# Patient Record
Sex: FEMALE | Race: WHITE | NOT HISPANIC OR LATINO | URBAN - METROPOLITAN AREA
[De-identification: names, ages, dates, MRNs, and addresses within clinical notes are randomized per-mention and may not be internally consistent; named-entity substitution may affect disease eponyms.]

---

## 2022-03-23 ENCOUNTER — LAB (OUTPATIENT)
Dept: LAB | Facility: HOSPITAL | Age: 72
End: 2022-03-23

## 2022-03-23 ENCOUNTER — OFFICE VISIT (OUTPATIENT)
Dept: INTERNAL MEDICINE | Facility: CLINIC | Age: 72
End: 2022-03-23

## 2022-03-23 VITALS
DIASTOLIC BLOOD PRESSURE: 80 MMHG | TEMPERATURE: 97.5 F | HEIGHT: 67 IN | WEIGHT: 213 LBS | BODY MASS INDEX: 33.43 KG/M2 | SYSTOLIC BLOOD PRESSURE: 124 MMHG

## 2022-03-23 DIAGNOSIS — E78.49 OTHER HYPERLIPIDEMIA: ICD-10-CM

## 2022-03-23 DIAGNOSIS — I10 BENIGN ESSENTIAL HTN: Primary | ICD-10-CM

## 2022-03-23 DIAGNOSIS — Z11.59 ENCOUNTER FOR HEPATITIS C SCREENING TEST FOR LOW RISK PATIENT: ICD-10-CM

## 2022-03-23 LAB
ALBUMIN SERPL-MCNC: 4.6 G/DL (ref 3.5–5.2)
ALBUMIN/GLOB SERPL: 1.8 G/DL
ALP SERPL-CCNC: 92 U/L (ref 39–117)
ALT SERPL W P-5'-P-CCNC: 18 U/L (ref 1–33)
ANION GAP SERPL CALCULATED.3IONS-SCNC: 11.1 MMOL/L (ref 5–15)
AST SERPL-CCNC: 25 U/L (ref 1–32)
BILIRUB SERPL-MCNC: 0.4 MG/DL (ref 0–1.2)
BUN SERPL-MCNC: 20 MG/DL (ref 8–23)
BUN/CREAT SERPL: 25.3 (ref 7–25)
CALCIUM SPEC-SCNC: 9.4 MG/DL (ref 8.6–10.5)
CHLORIDE SERPL-SCNC: 105 MMOL/L (ref 98–107)
CHOLEST SERPL-MCNC: 141 MG/DL (ref 0–200)
CO2 SERPL-SCNC: 24.9 MMOL/L (ref 22–29)
CREAT SERPL-MCNC: 0.79 MG/DL (ref 0.57–1)
DEPRECATED RDW RBC AUTO: 39.6 FL (ref 37–54)
EGFRCR SERPLBLD CKD-EPI 2021: 81.1 ML/MIN/1.73
ERYTHROCYTE [DISTWIDTH] IN BLOOD BY AUTOMATED COUNT: 12.2 % (ref 12.3–15.4)
GLOBULIN UR ELPH-MCNC: 2.5 GM/DL
GLUCOSE SERPL-MCNC: 94 MG/DL (ref 65–99)
HCT VFR BLD AUTO: 41.1 % (ref 34–46.6)
HCV AB SER DONR QL: NORMAL
HDLC SERPL-MCNC: 54 MG/DL (ref 40–60)
HGB BLD-MCNC: 13.7 G/DL (ref 12–15.9)
LDLC SERPL CALC-MCNC: 70 MG/DL (ref 0–100)
LDLC/HDLC SERPL: 1.28 {RATIO}
MCH RBC QN AUTO: 29.5 PG (ref 26.6–33)
MCHC RBC AUTO-ENTMCNC: 33.3 G/DL (ref 31.5–35.7)
MCV RBC AUTO: 88.4 FL (ref 79–97)
PLATELET # BLD AUTO: 210 10*3/MM3 (ref 140–450)
PMV BLD AUTO: 11.5 FL (ref 6–12)
POTASSIUM SERPL-SCNC: 4.3 MMOL/L (ref 3.5–5.2)
PROT SERPL-MCNC: 7.1 G/DL (ref 6–8.5)
RBC # BLD AUTO: 4.65 10*6/MM3 (ref 3.77–5.28)
SODIUM SERPL-SCNC: 141 MMOL/L (ref 136–145)
TRIGL SERPL-MCNC: 89 MG/DL (ref 0–150)
VLDLC SERPL-MCNC: 17 MG/DL (ref 5–40)
WBC NRBC COR # BLD: 5.47 10*3/MM3 (ref 3.4–10.8)

## 2022-03-23 PROCEDURE — 86803 HEPATITIS C AB TEST: CPT | Performed by: INTERNAL MEDICINE

## 2022-03-23 PROCEDURE — 80061 LIPID PANEL: CPT | Performed by: INTERNAL MEDICINE

## 2022-03-23 PROCEDURE — 85027 COMPLETE CBC AUTOMATED: CPT | Performed by: INTERNAL MEDICINE

## 2022-03-23 PROCEDURE — 80053 COMPREHEN METABOLIC PANEL: CPT | Performed by: INTERNAL MEDICINE

## 2022-03-23 PROCEDURE — 99204 OFFICE O/P NEW MOD 45 MIN: CPT | Performed by: INTERNAL MEDICINE

## 2022-03-23 RX ORDER — ATORVASTATIN CALCIUM 10 MG/1
10 TABLET, FILM COATED ORAL DAILY
COMMUNITY
Start: 2022-02-25 | End: 2022-11-02 | Stop reason: SDUPTHER

## 2022-03-23 RX ORDER — LISINOPRIL AND HYDROCHLOROTHIAZIDE 12.5; 1 MG/1; MG/1
1 TABLET ORAL DAILY
COMMUNITY
End: 2022-11-02

## 2022-03-23 NOTE — PROGRESS NOTES
"Subjective   Love Mackenzie is a 71 y.o. female here to establish care for HTN and HLD.  Patient not sure how long she has been on these medications.  She is actually been out of her blood pressure medication for at least several days (if not weeks) and blood pressure has been well controlled.  She is due for blood work, would like to get cholesterol checked today.  He is up-to-date on her mammogram and colonoscopy.  She is a former patient of Dr. Giselle Chapman.    Review of Systems   Constitutional: Negative.    HENT: Negative.    Eyes: Negative.    Respiratory: Negative.    Cardiovascular: Negative.    Gastrointestinal: Negative.    Endocrine: Negative.    Genitourinary: Negative.    Musculoskeletal: Negative.    Skin: Negative.    Allergic/Immunologic: Negative.    Neurological: Negative.    Hematological: Negative.    Psychiatric/Behavioral: Negative.        History reviewed. No pertinent past medical history.  Family History   Problem Relation Age of Onset   • Stroke Mother    • Cancer Father    • Liver disease Brother      Past Surgical History:   Procedure Laterality Date   • BREAST LUMPECTOMY       Social History     Socioeconomic History   • Marital status:    Tobacco Use   • Smoking status: Never Smoker   • Smokeless tobacco: Never Used   Vaping Use   • Vaping Use: Never used   Substance and Sexual Activity   • Alcohol use: Yes     Comment: 2-3   • Drug use: Never         Current Outpatient Medications:   •  atorvastatin (LIPITOR) 10 MG tablet, Take 10 mg by mouth Daily., Disp: , Rfl:   •  lisinopril-hydrochlorothiazide (PRINZIDE,ZESTORETIC) 10-12.5 MG per tablet, Take 1 tablet by mouth Daily., Disp: , Rfl:     Objective   /80   Temp 97.5 °F (36.4 °C) (Temporal)   Ht 170.2 cm (67\")   Wt 96.6 kg (213 lb)   BMI 33.36 kg/m²   Physical Exam  Vitals reviewed.   Constitutional:       Appearance: She is well-developed.   HENT:      Head: Normocephalic and atraumatic.      Nose: Nose " normal.   Eyes:      Conjunctiva/sclera: Conjunctivae normal.   Cardiovascular:      Rate and Rhythm: Normal rate and regular rhythm.      Heart sounds: Normal heart sounds. No murmur heard.    No friction rub. No gallop.   Pulmonary:      Effort: Pulmonary effort is normal.      Breath sounds: Normal breath sounds. No wheezing.   Musculoskeletal:      Comments: Normal gait and station   Skin:     General: Skin is warm and dry.   Neurological:      Mental Status: She is alert and oriented to person, place, and time.   Psychiatric:         Behavior: Behavior normal.         Thought Content: Thought content normal.         Judgment: Judgment normal.         Assessment/Plan   Diagnoses and all orders for this visit:    1. Benign essential HTN (Primary)  -     CBC (No Diff)  -     Comprehensive Metabolic Panel  -Stop BP med as she has been off of it and blood pressure has been well controlled.  Will have patient keep close tabs on blood pressure readings and will see her again soon for physical to monitor need for resumption of blood pressure medication    2. Other hyperlipidemia  -     Lipid Panel  -Continue statin but will do ASCVD score and discuss statin with patient    3. Encounter for hepatitis C screening test for low risk patient  -     Hepatitis C Antibody

## 2022-09-26 ENCOUNTER — TELEPHONE (OUTPATIENT)
Dept: INTERNAL MEDICINE | Facility: CLINIC | Age: 72
End: 2022-09-26

## 2022-10-18 ENCOUNTER — TELEPHONE (OUTPATIENT)
Dept: INTERNAL MEDICINE | Facility: CLINIC | Age: 72
End: 2022-10-18

## 2022-10-18 NOTE — TELEPHONE ENCOUNTER
Caller: Love Mackenzie    Relationship to patient: Self    Best call back number:[phone]      PATIENT HAS A PHYSICAL SCHEDULED FOR  November 2, 2022 WITH A PROVIDER THAT IS NOT HER PCP

## 2022-11-02 ENCOUNTER — OFFICE VISIT (OUTPATIENT)
Dept: INTERNAL MEDICINE | Facility: CLINIC | Age: 72
End: 2022-11-02

## 2022-11-02 ENCOUNTER — LAB (OUTPATIENT)
Dept: LAB | Facility: HOSPITAL | Age: 72
End: 2022-11-02

## 2022-11-02 ENCOUNTER — TELEPHONE (OUTPATIENT)
Dept: INTERNAL MEDICINE | Facility: CLINIC | Age: 72
End: 2022-11-02

## 2022-11-02 VITALS
WEIGHT: 214 LBS | HEART RATE: 75 BPM | SYSTOLIC BLOOD PRESSURE: 122 MMHG | BODY MASS INDEX: 33.59 KG/M2 | RESPIRATION RATE: 16 BRPM | HEIGHT: 67 IN | TEMPERATURE: 98.5 F | DIASTOLIC BLOOD PRESSURE: 74 MMHG | OXYGEN SATURATION: 98 %

## 2022-11-02 DIAGNOSIS — Z13.220 LIPID SCREENING: ICD-10-CM

## 2022-11-02 DIAGNOSIS — E78.49 OTHER HYPERLIPIDEMIA: ICD-10-CM

## 2022-11-02 DIAGNOSIS — Z13.1 SCREENING FOR DIABETES MELLITUS: ICD-10-CM

## 2022-11-02 DIAGNOSIS — I10 BENIGN ESSENTIAL HTN: ICD-10-CM

## 2022-11-02 DIAGNOSIS — Z13.0 SCREENING FOR BLOOD DISEASE: ICD-10-CM

## 2022-11-02 DIAGNOSIS — Z13.21 ENCOUNTER FOR VITAMIN DEFICIENCY SCREENING: ICD-10-CM

## 2022-11-02 DIAGNOSIS — Z13.29 THYROID DISORDER SCREENING: ICD-10-CM

## 2022-11-02 DIAGNOSIS — Z00.00 WELLNESS EXAMINATION: Primary | ICD-10-CM

## 2022-11-02 PROCEDURE — 82746 ASSAY OF FOLIC ACID SERUM: CPT | Performed by: NURSE PRACTITIONER

## 2022-11-02 PROCEDURE — 80050 GENERAL HEALTH PANEL: CPT | Performed by: NURSE PRACTITIONER

## 2022-11-02 PROCEDURE — 83036 HEMOGLOBIN GLYCOSYLATED A1C: CPT | Performed by: NURSE PRACTITIONER

## 2022-11-02 PROCEDURE — 82607 VITAMIN B-12: CPT | Performed by: NURSE PRACTITIONER

## 2022-11-02 PROCEDURE — 99397 PER PM REEVAL EST PAT 65+ YR: CPT | Performed by: NURSE PRACTITIONER

## 2022-11-02 PROCEDURE — 82306 VITAMIN D 25 HYDROXY: CPT | Performed by: NURSE PRACTITIONER

## 2022-11-02 PROCEDURE — 80061 LIPID PANEL: CPT | Performed by: NURSE PRACTITIONER

## 2022-11-02 RX ORDER — ATORVASTATIN CALCIUM 10 MG/1
10 TABLET, FILM COATED ORAL DAILY
Qty: 90 TABLET | Refills: 1 | Status: SHIPPED | OUTPATIENT
Start: 2022-11-02 | End: 2022-11-11 | Stop reason: SDUPTHER

## 2022-11-02 NOTE — TELEPHONE ENCOUNTER
Patient had colonoscopy at [facility] about 9 years ago. Not due until next year. Can you call over and ask for records for her health maintanence Thanks.

## 2022-11-03 LAB
25(OH)D3 SERPL-MCNC: 70.1 NG/ML (ref 30–100)
ALBUMIN SERPL-MCNC: 4.7 G/DL (ref 3.5–5.2)
ALBUMIN/GLOB SERPL: 2 G/DL
ALP SERPL-CCNC: 102 U/L (ref 39–117)
ALT SERPL W P-5'-P-CCNC: 23 U/L (ref 1–33)
ANION GAP SERPL CALCULATED.3IONS-SCNC: 9.1 MMOL/L (ref 5–15)
AST SERPL-CCNC: 29 U/L (ref 1–32)
BILIRUB SERPL-MCNC: 0.3 MG/DL (ref 0–1.2)
BUN SERPL-MCNC: 21 MG/DL (ref 8–23)
BUN/CREAT SERPL: 25.9 (ref 7–25)
CALCIUM SPEC-SCNC: 9.6 MG/DL (ref 8.6–10.5)
CHLORIDE SERPL-SCNC: 106 MMOL/L (ref 98–107)
CHOLEST SERPL-MCNC: 185 MG/DL (ref 0–200)
CO2 SERPL-SCNC: 26.9 MMOL/L (ref 22–29)
CREAT SERPL-MCNC: 0.81 MG/DL (ref 0.57–1)
DEPRECATED RDW RBC AUTO: 43.1 FL (ref 37–54)
EGFRCR SERPLBLD CKD-EPI 2021: 78.2 ML/MIN/1.73
ERYTHROCYTE [DISTWIDTH] IN BLOOD BY AUTOMATED COUNT: 12.6 % (ref 12.3–15.4)
FOLATE SERPL-MCNC: >20 NG/ML (ref 4.78–24.2)
GLOBULIN UR ELPH-MCNC: 2.4 GM/DL
GLUCOSE SERPL-MCNC: 84 MG/DL (ref 65–99)
HBA1C MFR BLD: 5.7 % (ref 4.8–5.6)
HCT VFR BLD AUTO: 43.3 % (ref 34–46.6)
HDLC SERPL-MCNC: 62 MG/DL (ref 40–60)
HGB BLD-MCNC: 14.2 G/DL (ref 12–15.9)
LDLC SERPL CALC-MCNC: 110 MG/DL (ref 0–100)
LDLC/HDLC SERPL: 1.75 {RATIO}
MCH RBC QN AUTO: 30.4 PG (ref 26.6–33)
MCHC RBC AUTO-ENTMCNC: 32.8 G/DL (ref 31.5–35.7)
MCV RBC AUTO: 92.7 FL (ref 79–97)
PLATELET # BLD AUTO: 204 10*3/MM3 (ref 140–450)
PMV BLD AUTO: 11.4 FL (ref 6–12)
POTASSIUM SERPL-SCNC: 4.8 MMOL/L (ref 3.5–5.2)
PROT SERPL-MCNC: 7.1 G/DL (ref 6–8.5)
RBC # BLD AUTO: 4.67 10*6/MM3 (ref 3.77–5.28)
SODIUM SERPL-SCNC: 142 MMOL/L (ref 136–145)
TRIGL SERPL-MCNC: 71 MG/DL (ref 0–150)
TSH SERPL DL<=0.05 MIU/L-ACNC: 2.79 UIU/ML (ref 0.27–4.2)
VIT B12 BLD-MCNC: 818 PG/ML (ref 211–946)
VLDLC SERPL-MCNC: 13 MG/DL (ref 5–40)
WBC NRBC COR # BLD: 5.8 10*3/MM3 (ref 3.4–10.8)

## 2022-11-11 RX ORDER — ATORVASTATIN CALCIUM 10 MG/1
10 TABLET, FILM COATED ORAL DAILY
Qty: 90 TABLET | Refills: 1 | Status: SHIPPED | OUTPATIENT
Start: 2022-11-11

## 2022-11-11 NOTE — TELEPHONE ENCOUNTER
Caller: Love Mackenzie    Relationship: Self    Best call back number:     [phone]    Requested Prescriptions:      atorvastatin (LIPITOR) 10 MG tablet    Pharmacy where request should be sent: [id number] [place] [address] [address] [facility] [address] - [phone]  - [phone]      Additional details provided by patient:     PATIENT STATED SHE IS OUT OF THE MEDICATION    Does the patient have less than a 3 day supply:  [x] Yes  [] No    Cecy Motley Rep   11/11/22 15:22 EST     SORAYA CUBAL

## 2023-11-06 RX ORDER — ATORVASTATIN CALCIUM 10 MG/1
10 TABLET, FILM COATED ORAL DAILY
Qty: 90 TABLET | Refills: 0 | OUTPATIENT
Start: 2023-11-06

## 2023-11-08 ENCOUNTER — LAB (OUTPATIENT)
Dept: INTERNAL MEDICINE | Facility: CLINIC | Age: 73
End: 2023-11-08
Payer: MEDICARE

## 2023-11-08 ENCOUNTER — OFFICE VISIT (OUTPATIENT)
Dept: INTERNAL MEDICINE | Facility: CLINIC | Age: 73
End: 2023-11-08
Payer: MEDICARE

## 2023-11-08 VITALS
HEART RATE: 72 BPM | DIASTOLIC BLOOD PRESSURE: 84 MMHG | HEIGHT: 67 IN | OXYGEN SATURATION: 95 % | WEIGHT: 218 LBS | SYSTOLIC BLOOD PRESSURE: 132 MMHG | BODY MASS INDEX: 34.21 KG/M2

## 2023-11-08 DIAGNOSIS — Z00.00 WELCOME TO MEDICARE PREVENTIVE VISIT: Primary | ICD-10-CM

## 2023-11-08 DIAGNOSIS — Z12.11 COLON CANCER SCREENING: ICD-10-CM

## 2023-11-08 DIAGNOSIS — Z78.0 POST-MENOPAUSAL: ICD-10-CM

## 2023-11-08 DIAGNOSIS — I10 BENIGN ESSENTIAL HTN: ICD-10-CM

## 2023-11-08 DIAGNOSIS — R73.03 PREDIABETES: ICD-10-CM

## 2023-11-08 DIAGNOSIS — I10 BENIGN ESSENTIAL HTN: Primary | ICD-10-CM

## 2023-11-08 LAB
ALBUMIN SERPL-MCNC: 4.4 G/DL (ref 3.5–5.2)
ALBUMIN/GLOB SERPL: 1.6 G/DL
ALP SERPL-CCNC: 98 U/L (ref 39–117)
ALT SERPL W P-5'-P-CCNC: 17 U/L (ref 1–33)
ANION GAP SERPL CALCULATED.3IONS-SCNC: 10 MMOL/L (ref 5–15)
AST SERPL-CCNC: 24 U/L (ref 1–32)
BILIRUB SERPL-MCNC: 0.4 MG/DL (ref 0–1.2)
BUN SERPL-MCNC: 19 MG/DL (ref 8–23)
BUN/CREAT SERPL: 24.7 (ref 7–25)
CALCIUM SPEC-SCNC: 9.5 MG/DL (ref 8.6–10.5)
CHLORIDE SERPL-SCNC: 105 MMOL/L (ref 98–107)
CHOLEST SERPL-MCNC: 144 MG/DL (ref 0–200)
CO2 SERPL-SCNC: 25 MMOL/L (ref 22–29)
CREAT SERPL-MCNC: 0.77 MG/DL (ref 0.57–1)
DEPRECATED RDW RBC AUTO: 37.7 FL (ref 37–54)
EGFRCR SERPLBLD CKD-EPI 2021: 82.6 ML/MIN/1.73
ERYTHROCYTE [DISTWIDTH] IN BLOOD BY AUTOMATED COUNT: 11.9 % (ref 12.3–15.4)
GLOBULIN UR ELPH-MCNC: 2.7 GM/DL
GLUCOSE SERPL-MCNC: 83 MG/DL (ref 65–99)
HBA1C MFR BLD: 5.6 % (ref 4.8–5.6)
HCT VFR BLD AUTO: 41.4 % (ref 34–46.6)
HDLC SERPL-MCNC: 57 MG/DL (ref 40–60)
HGB BLD-MCNC: 13.9 G/DL (ref 12–15.9)
LDLC SERPL CALC-MCNC: 71 MG/DL (ref 0–100)
LDLC/HDLC SERPL: 1.24 {RATIO}
MCH RBC QN AUTO: 29.6 PG (ref 26.6–33)
MCHC RBC AUTO-ENTMCNC: 33.6 G/DL (ref 31.5–35.7)
MCV RBC AUTO: 88.1 FL (ref 79–97)
PLATELET # BLD AUTO: 199 10*3/MM3 (ref 140–450)
PMV BLD AUTO: 11.6 FL (ref 6–12)
POTASSIUM SERPL-SCNC: 4.7 MMOL/L (ref 3.5–5.2)
PROT SERPL-MCNC: 7.1 G/DL (ref 6–8.5)
RBC # BLD AUTO: 4.7 10*6/MM3 (ref 3.77–5.28)
SODIUM SERPL-SCNC: 140 MMOL/L (ref 136–145)
TRIGL SERPL-MCNC: 83 MG/DL (ref 0–150)
VLDLC SERPL-MCNC: 16 MG/DL (ref 5–40)
WBC NRBC COR # BLD: 5.21 10*3/MM3 (ref 3.4–10.8)

## 2023-11-08 PROCEDURE — 83036 HEMOGLOBIN GLYCOSYLATED A1C: CPT | Performed by: INTERNAL MEDICINE

## 2023-11-08 PROCEDURE — 80061 LIPID PANEL: CPT | Performed by: INTERNAL MEDICINE

## 2023-11-08 PROCEDURE — 36415 COLL VENOUS BLD VENIPUNCTURE: CPT | Performed by: INTERNAL MEDICINE

## 2023-11-08 PROCEDURE — 80053 COMPREHEN METABOLIC PANEL: CPT | Performed by: INTERNAL MEDICINE

## 2023-11-08 PROCEDURE — 85027 COMPLETE CBC AUTOMATED: CPT | Performed by: INTERNAL MEDICINE

## 2023-11-08 NOTE — PROGRESS NOTES
The ABCs of the Annual Wellness Visit  [place] Visit    Subjective     Love Mackenzie is a 73 y.o. female who presents for a  Welcome to Medicare Visit.    The following portions of the patient's history were reviewed and   updated as appropriate: allergies, current medications, past medical history, past social history, past surgical history, and problem list.     Compared to one year ago, the patient feels her physical   health is the same.    Compared to one year ago, the patient feels her mental   health is the same.    Recent Hospitalizations:  She was not admitted to the hospital during the last year.       Current Medical Providers:  Patient Care Team:  Silvia Garcia MD as PCP - General (Internal Medicine)    Outpatient Medications Prior to Visit   Medication Sig Dispense Refill   • atorvastatin (LIPITOR) 10 MG tablet Take 1 tablet by mouth Daily. 90 tablet 1     No facility-administered medications prior to visit.       No opioid medication identified on active medication list. I have reviewed chart for other potential  high risk medication/s and harmful drug interactions in the elderly.        Physical Exam  Vitals reviewed.   Constitutional:       General: She is not in acute distress.     Appearance: Normal appearance. She is well-developed.   HENT:      Head: Normocephalic and atraumatic.      Right Ear: Tympanic membrane, ear canal and external ear normal.      Left Ear: Tympanic membrane, ear canal and external ear normal.      Nose: Nose normal.      Mouth/Throat:      Lips: Pink.      Mouth: Mucous membranes are moist.      Tongue: No lesions.      Pharynx: Oropharynx is clear.   Eyes:      General: Lids are normal. Vision grossly intact. No scleral icterus.     Conjunctiva/sclera: Conjunctivae normal.      Pupils: Pupils are equal, round, and reactive to light.   Neck:      Thyroid: No thyroid mass, thyromegaly or thyroid tenderness.      Vascular: No carotid bruit.    Cardiovascular:      Rate and Rhythm: Normal rate and regular rhythm.      Heart sounds: Normal heart sounds. No murmur heard.     No friction rub. No gallop. No S3 or S4 sounds.   Pulmonary:      Effort: Pulmonary effort is normal.      Breath sounds: Normal breath sounds. No wheezing, rhonchi or rales.   Abdominal:      General: Bowel sounds are normal. There is no distension.      Palpations: Abdomen is soft. There is no mass.      Tenderness: There is no abdominal tenderness.   Musculoskeletal:         General: Normal range of motion.      Cervical back: Neck supple.      Right lower leg: No edema.      Left lower leg: No edema.   Lymphadenopathy:      Cervical: No cervical adenopathy.   Skin:     General: Skin is warm and dry.      Coloration: Skin is not pale.      Findings: No erythema or rash.   Neurological:      Mental Status: She is alert and oriented to person, place, and time. Mental status is at baseline.      Cranial Nerves: No cranial nerve deficit.      Sensory: No sensory deficit.      Gait: Gait is intact.      Comments: CNs grossly intact   Psychiatric:         Attention and Perception: Attention normal.         Mood and Affect: Mood normal.         Speech: Speech normal.         Behavior: Behavior normal.         Thought Content: Thought content normal.         Judgment: Judgment normal.       Aspirin is not on active medication list.  Aspirin use is not indicated based on review of current medical condition/s. Risk of harm outweighs potential benefits.  .    Patient Active Problem List   Diagnosis   • Other hyperlipidemia   • Benign essential HTN     Advance Care Planning   Advance Care Planning     Advance Directive is not on file.  ACP discussion was declined by the patient. Patient does not have an advance directive, declines further assistance.       Objective   Vitals:    11/08/23 0903   BP: 132/84   BP Location: Left arm   Pulse: 72   SpO2: 95%   Weight: 98.9 kg (218 lb)   Height: 170.2  "cm (67\")     Estimated body mass index is 34.14 kg/m² as calculated from the following:    Height as of this encounter: 170.2 cm (67\").    Weight as of this encounter: 98.9 kg (218 lb).           Does the patient have evidence of cognitive impairment?   No         Procedures       HEALTH RISK ASSESSMENT    Smoking Status:  Social History     Tobacco Use   Smoking Status Never   • Passive exposure: Never   Smokeless Tobacco Never     Alcohol Consumption:  Social History     Substance and Sexual Activity   Alcohol Use Yes   • Alcohol/week: 2.0 standard drinks of alcohol   • Types: 2 Glasses of wine per week    Comment: 2-3       Fall Risk Screen:    LAUREN Fall Risk Assessment was completed, and patient is at LOW risk for falls.Assessment completed on:2023    Depression Screen:       2023     9:00 AM   PHQ-2/PHQ-9 Depression Screening   Little Interest or Pleasure in Doing Things 0-->not at all   Feeling Down, Depressed or Hopeless 0-->not at all   Trouble Falling or Staying Asleep, or Sleeping Too Much 0-->not at all   Feeling Tired or Having Little Energy 0-->not at all   Poor Appetite or Overeating 0-->not at all   Feeling Bad about Yourself - or that You are a Failure or Have Let Yourself or Your Family Down 0-->not at all   Trouble Concentrating on Things, Such as Reading the Newspaper or Watching Television 0-->not at all   Moving or Speaking So Slowly that Other People Could Have Noticed? Or the Opposite - Being So Fidgety 0-->not at all   Thoughts that You Would be Better Off Dead or of Hurting Yourself in Some Way 0-->not at all   PHQ-9: Brief Depression Severity Measure Score 0   If You Checked Off Any Problems, How Difficult Have These Problems Made It For You to Do Your Work, Take Care of Things at Home, or Get Along with Other People? not difficult at all       Health Habits and Functional and Cognitive Screenin/8/2023     9:00 AM   Functional & Cognitive Status   Do you have " difficulty preparing food and eating? No   Do you have difficulty bathing yourself, getting dressed or grooming yourself? No   Do you have difficulty using the toilet? No   Do you have difficulty moving around from place to place? No   Do you have trouble with steps or getting out of a bed or a chair? No   Current Diet Limited Junk Food   Dental Exam Up to date   Eye Exam Up to date   Exercise (times per week) 2 times per week   Current Exercises Include Walking   Do you need help using the phone?  No   Are you deaf or do you have serious difficulty hearing?  No   Do you need help to go to places out of walking distance? No   Do you need help shopping? No   Do you need help preparing meals?  No   Do you need help with housework?  No   Do you need help with laundry? No   Do you need help taking your medications? No   Do you need help managing money? No   Do you ever drive or ride in a car without wearing a seat belt? No   Have you felt unusual stress, anger or loneliness in the last month? No   Who do you live with? Spouse   If you need help, do you have trouble finding someone available to you? No   Have you been bothered in the last four weeks by sexual problems? No   Do you have difficulty concentrating, remembering or making decisions? No       Visual Acuity:    Vision Screening    Right eye Left eye Both eyes   Without correction 20/70  20/25   With correction  20/30 20/25       Age-appropriate Screening Schedule:  Refer to the list below for future screening recommendations based on patient's age, sex and/or medical conditions. Orders for these recommended tests are listed in the plan section. The patient has been provided with a written plan.    Health Maintenance   Topic Date Due   • DXA SCAN  Never done   • COLORECTAL CANCER SCREENING  Never done   • Pneumococcal Vaccine 65+ (2 - PPSV23 or PCV20) 03/29/2020   • ANNUAL WELLNESS VISIT  Never done   • LIPID PANEL  11/02/2023   • INFLUENZA VACCINE  03/31/2024  (Originally 8/1/2023)   • COVID-19 Vaccine (1) 11/10/2029 (Originally 3/5/1951)   • BMI FOLLOWUP  11/08/2024   • MAMMOGRAM  04/12/2025   • TDAP/TD VACCINES (2 - Td or Tdap) 03/09/2031   • HEPATITIS C SCREENING  Completed   • ZOSTER VACCINE  Completed        CMS Preventative Services Quick Reference  Risk Factors Identified During Encounter    Immunizations Discussed/Encouraged: Tdap, Influenza, Shingrix, and COVID19  The above risks/problems have been discussed with the patient.  Pertinent information has been shared with the patient in the After Visit Summary.  Follow up plans and orders are seen below in the Assessment/Plan Section.    Diagnoses and all orders for this visit:    1. Welcome to Medicare preventive visit (Primary)  -CSY and DXA ordered, UTD mammo  -has had prevnar 13  -defers flu and covid vax  -sees dentist and derm    2. Benign essential HTN  -     CBC (No Diff)  -     Comprehensive Metabolic Panel  -     Lipid Panel    3. Prediabetes  -     Hemoglobin A1c    4. Post-menopausal  -     DEXA Bone Density Axial; Future    5. Colon cancer screening  -     Ambulatory Referral For Screening Colonoscopy        Follow Up:   Initial Medicare Visit in one year    An After Visit Summary and PPPS were made available to the patient.

## 2023-11-15 RX ORDER — ATORVASTATIN CALCIUM 10 MG/1
10 TABLET, FILM COATED ORAL DAILY
Qty: 90 TABLET | Refills: 0 | Status: SHIPPED | OUTPATIENT
Start: 2023-11-15

## 2023-12-07 ENCOUNTER — HOSPITAL ENCOUNTER (OUTPATIENT)
Dept: BONE DENSITY | Facility: HOSPITAL | Age: 73
Discharge: HOME | End: 2023-12-07
Admitting: INTERNAL MEDICINE
Payer: MEDICARE

## 2023-12-07 DIAGNOSIS — Z78.0 POST-MENOPAUSAL: ICD-10-CM

## 2023-12-07 PROCEDURE — 77080 DXA BONE DENSITY AXIAL: CPT

## 2024-01-29 ENCOUNTER — OUTSIDE FACILITY SERVICE (OUTPATIENT)
Dept: GASTROENTEROLOGY | Facility: CLINIC | Age: 74
End: 2024-01-29
Payer: MEDICARE

## 2024-02-14 RX ORDER — ATORVASTATIN CALCIUM 10 MG/1
10 TABLET, FILM COATED ORAL DAILY
Qty: 90 TABLET | Refills: 0 | Status: SHIPPED | OUTPATIENT
Start: 2024-02-14

## 2024-05-17 ENCOUNTER — LAB (OUTPATIENT)
Facility: HOSPITAL | Age: 74
End: 2024-05-17
Payer: MEDICARE

## 2024-05-17 ENCOUNTER — APPOINTMENT (OUTPATIENT)
Facility: HOSPITAL | Age: 74
DRG: 871 | End: 2024-05-17
Payer: MEDICARE

## 2024-05-17 ENCOUNTER — HOSPITAL ENCOUNTER (OUTPATIENT)
Facility: HOSPITAL | Age: 74
Discharge: HOME | DRG: 871 | End: 2024-05-18
Attending: FAMILY MEDICINE | Admitting: FAMILY MEDICINE
Payer: MEDICARE

## 2024-05-17 ENCOUNTER — OFFICE VISIT (OUTPATIENT)
Dept: INTERNAL MEDICINE | Facility: CLINIC | Age: 74
End: 2024-05-17
Payer: MEDICARE

## 2024-05-17 VITALS
HEIGHT: 67 IN | BODY MASS INDEX: 34.53 KG/M2 | HEART RATE: 78 BPM | DIASTOLIC BLOOD PRESSURE: 82 MMHG | WEIGHT: 220 LBS | SYSTOLIC BLOOD PRESSURE: 130 MMHG | OXYGEN SATURATION: 99 % | TEMPERATURE: 98 F

## 2024-05-17 DIAGNOSIS — R19.7 ACUTE DIARRHEA: ICD-10-CM

## 2024-05-17 DIAGNOSIS — J96.01 ACUTE HYPOXIC RESPIRATORY FAILURE: ICD-10-CM

## 2024-05-17 DIAGNOSIS — R53.83 LETHARGY: Primary | ICD-10-CM

## 2024-05-17 DIAGNOSIS — R53.83 LETHARGY: ICD-10-CM

## 2024-05-17 DIAGNOSIS — J18.9 PNEUMONIA OF LEFT LOWER LOBE DUE TO INFECTIOUS ORGANISM: Primary | ICD-10-CM

## 2024-05-17 LAB
ALBUMIN SERPL-MCNC: 3.7 G/DL (ref 3.5–5.2)
ALBUMIN SERPL-MCNC: 3.8 G/DL (ref 3.5–5.2)
ALBUMIN/GLOB SERPL: 1.1 G/DL
ALBUMIN/GLOB SERPL: 1.2 G/DL
ALP SERPL-CCNC: 77 U/L (ref 39–117)
ALP SERPL-CCNC: 78 U/L (ref 39–117)
ALT SERPL W P-5'-P-CCNC: 25 U/L (ref 1–33)
ALT SERPL W P-5'-P-CCNC: 31 U/L (ref 1–33)
ANION GAP SERPL CALCULATED.3IONS-SCNC: 13.8 MMOL/L (ref 5–15)
ANION GAP SERPL CALCULATED.3IONS-SCNC: 17.2 MMOL/L (ref 5–15)
AST SERPL-CCNC: 24 U/L (ref 1–32)
AST SERPL-CCNC: 38 U/L (ref 1–32)
ATMOSPHERIC PRESS: ABNORMAL MM[HG]
BASE EXCESS BLDV CALC-SCNC: -1.9 MMOL/L (ref -2–2)
BASOPHILS # BLD AUTO: 0.02 10*3/MM3 (ref 0–0.2)
BASOPHILS # BLD AUTO: 0.03 10*3/MM3 (ref 0–0.2)
BASOPHILS NFR BLD AUTO: 0.2 % (ref 0–1.5)
BASOPHILS NFR BLD AUTO: 0.2 % (ref 0–1.5)
BDY SITE: ABNORMAL
BILIRUB SERPL-MCNC: 0.5 MG/DL (ref 0–1.2)
BILIRUB SERPL-MCNC: 0.6 MG/DL (ref 0–1.2)
BODY TEMPERATURE: 37
BUN SERPL-MCNC: 22 MG/DL (ref 8–23)
BUN SERPL-MCNC: 22 MG/DL (ref 8–23)
BUN/CREAT SERPL: 23.9 (ref 7–25)
BUN/CREAT SERPL: 25.9 (ref 7–25)
CALCIUM SPEC-SCNC: 8.7 MG/DL (ref 8.6–10.5)
CALCIUM SPEC-SCNC: 8.8 MG/DL (ref 8.6–10.5)
CHLORIDE SERPL-SCNC: 91 MMOL/L (ref 98–107)
CHLORIDE SERPL-SCNC: 95 MMOL/L (ref 98–107)
CO2 BLDA-SCNC: 22.1 MMOL/L (ref 22–33)
CO2 SERPL-SCNC: 17.8 MMOL/L (ref 22–29)
CO2 SERPL-SCNC: 20.2 MMOL/L (ref 22–29)
COHGB MFR BLD: 1.1 %
CREAT SERPL-MCNC: 0.85 MG/DL (ref 0.57–1)
CREAT SERPL-MCNC: 0.92 MG/DL (ref 0.57–1)
CRP SERPL-MCNC: 32.1 MG/DL (ref 0–0.5)
D-LACTATE SERPL-SCNC: 1.8 MMOL/L (ref 0.5–2)
DEPRECATED RDW RBC AUTO: 38.3 FL (ref 37–54)
DEPRECATED RDW RBC AUTO: 42.4 FL (ref 37–54)
EGFRCR SERPLBLD CKD-EPI 2021: 66.7 ML/MIN/1.73
EGFRCR SERPLBLD CKD-EPI 2021: 73.4 ML/MIN/1.73
EOSINOPHIL # BLD AUTO: 0 10*3/MM3 (ref 0–0.4)
EOSINOPHIL # BLD AUTO: 0 10*3/MM3 (ref 0–0.4)
EOSINOPHIL NFR BLD AUTO: 0 % (ref 0.3–6.2)
EOSINOPHIL NFR BLD AUTO: 0 % (ref 0.3–6.2)
EPAP: 0
ERYTHROCYTE [DISTWIDTH] IN BLOOD BY AUTOMATED COUNT: 12.1 % (ref 12.3–15.4)
ERYTHROCYTE [DISTWIDTH] IN BLOOD BY AUTOMATED COUNT: 12.8 % (ref 12.3–15.4)
ERYTHROCYTE [SEDIMENTATION RATE] IN BLOOD: 56 MM/HR (ref 0–30)
EXPIRATION DATE: NORMAL
FLUAV AG UPPER RESP QL IA.RAPID: NOT DETECTED
FLUAV RNA RESP QL NAA+PROBE: NOT DETECTED
FLUBV AG UPPER RESP QL IA.RAPID: NOT DETECTED
FLUBV RNA RESP QL NAA+PROBE: NOT DETECTED
GLOBULIN UR ELPH-MCNC: 3.2 GM/DL
GLOBULIN UR ELPH-MCNC: 3.5 GM/DL
GLUCOSE SERPL-MCNC: 120 MG/DL (ref 65–99)
GLUCOSE SERPL-MCNC: 123 MG/DL (ref 65–99)
HCO3 BLDV-SCNC: 21.1 MMOL/L (ref 22–28)
HCT VFR BLD AUTO: 37.8 % (ref 34–46.6)
HCT VFR BLD AUTO: 37.8 % (ref 34–46.6)
HETEROPH AB SER QL LA: NEGATIVE
HGB BLD-MCNC: 12.8 G/DL (ref 12–15.9)
HGB BLD-MCNC: 13.1 G/DL (ref 12–15.9)
HGB BLDA-MCNC: 13.1 G/DL (ref 14–18)
HOLD SPECIMEN: NORMAL
IMM GRANULOCYTES # BLD AUTO: 0.06 10*3/MM3 (ref 0–0.05)
IMM GRANULOCYTES # BLD AUTO: 0.07 10*3/MM3 (ref 0–0.05)
IMM GRANULOCYTES NFR BLD AUTO: 0.5 % (ref 0–0.5)
IMM GRANULOCYTES NFR BLD AUTO: 0.5 % (ref 0–0.5)
INHALED O2 CONCENTRATION: 28 %
INTERNAL CONTROL: NORMAL
IPAP: 0
LYMPHOCYTES # BLD AUTO: 0.5 10*3/MM3 (ref 0.7–3.1)
LYMPHOCYTES # BLD AUTO: 0.7 10*3/MM3 (ref 0.7–3.1)
LYMPHOCYTES NFR BLD AUTO: 3.5 % (ref 19.6–45.3)
LYMPHOCYTES NFR BLD AUTO: 5.4 % (ref 19.6–45.3)
Lab: NORMAL
MAGNESIUM SERPL-MCNC: 2.2 MG/DL (ref 1.6–2.4)
MCH RBC QN AUTO: 29.4 PG (ref 26.6–33)
MCH RBC QN AUTO: 30.5 PG (ref 26.6–33)
MCHC RBC AUTO-ENTMCNC: 33.9 G/DL (ref 31.5–35.7)
MCHC RBC AUTO-ENTMCNC: 34.7 G/DL (ref 31.5–35.7)
MCV RBC AUTO: 86.9 FL (ref 79–97)
MCV RBC AUTO: 87.9 FL (ref 79–97)
METHGB BLD QL: 0.7 %
MODALITY: ABNORMAL
MONOCYTES # BLD AUTO: 0.87 10*3/MM3 (ref 0.1–0.9)
MONOCYTES # BLD AUTO: 1.02 10*3/MM3 (ref 0.1–0.9)
MONOCYTES NFR BLD AUTO: 6.8 % (ref 5–12)
MONOCYTES NFR BLD AUTO: 7.1 % (ref 5–12)
NEUTROPHILS NFR BLD AUTO: 11.21 10*3/MM3 (ref 1.7–7)
NEUTROPHILS NFR BLD AUTO: 12.84 10*3/MM3 (ref 1.7–7)
NEUTROPHILS NFR BLD AUTO: 87.1 % (ref 42.7–76)
NEUTROPHILS NFR BLD AUTO: 88.7 % (ref 42.7–76)
NRBC BLD AUTO-RTO: 0 /100 WBC (ref 0–0.2)
NT-PROBNP SERPL-MCNC: 351 PG/ML (ref 0–900)
OXYHGB MFR BLDV: 64.4 %
PAW @ PEAK INSP FLOW SETTING VENT: 0 CMH2O
PCO2 BLDV: 30.5 MM HG (ref 41–51)
PH BLDV: 7.45 PH UNITS (ref 7.31–7.41)
PLATELET # BLD AUTO: 160 10*3/MM3 (ref 140–450)
PLATELET # BLD AUTO: 170 10*3/MM3 (ref 140–450)
PMV BLD AUTO: 11.5 FL (ref 6–12)
PMV BLD AUTO: 12.1 FL (ref 6–12)
PO2 BLDV: 30.5 MM HG (ref 27–53)
POTASSIUM SERPL-SCNC: 3.6 MMOL/L (ref 3.5–5.2)
POTASSIUM SERPL-SCNC: 4.5 MMOL/L (ref 3.5–5.2)
PROCALCITONIN SERPL-MCNC: 1.23 NG/ML (ref 0–0.25)
PROT SERPL-MCNC: 7 G/DL (ref 6–8.5)
PROT SERPL-MCNC: 7.2 G/DL (ref 6–8.5)
RBC # BLD AUTO: 4.3 10*6/MM3 (ref 3.77–5.28)
RBC # BLD AUTO: 4.35 10*6/MM3 (ref 3.77–5.28)
RSV RNA RESP QL NAA+PROBE: NOT DETECTED
SARS-COV-2 AG UPPER RESP QL IA.RAPID: NOT DETECTED
SARS-COV-2 RNA RESP QL NAA+PROBE: NOT DETECTED
SODIUM SERPL-SCNC: 126 MMOL/L (ref 136–145)
SODIUM SERPL-SCNC: 129 MMOL/L (ref 136–145)
TOTAL RATE: 0 BREATHS/MINUTE
TROPONIN T SERPL HS-MCNC: 18 NG/L
TSH SERPL DL<=0.05 MIU/L-ACNC: 1.61 UIU/ML (ref 0.27–4.2)
WBC NRBC COR # BLD AUTO: 12.86 10*3/MM3 (ref 3.4–10.8)
WBC NRBC COR # BLD AUTO: 14.46 10*3/MM3 (ref 3.4–10.8)
WHOLE BLOOD HOLD COAG: NORMAL
WHOLE BLOOD HOLD SPECIMEN: NORMAL

## 2024-05-17 PROCEDURE — 86140 C-REACTIVE PROTEIN: CPT

## 2024-05-17 PROCEDURE — 80053 COMPREHEN METABOLIC PANEL: CPT | Performed by: FAMILY MEDICINE

## 2024-05-17 PROCEDURE — 87040 BLOOD CULTURE FOR BACTERIA: CPT

## 2024-05-17 PROCEDURE — 94640 AIRWAY INHALATION TREATMENT: CPT

## 2024-05-17 PROCEDURE — 74177 CT ABD & PELVIS W/CONTRAST: CPT

## 2024-05-17 PROCEDURE — 87428 SARSCOV & INF VIR A&B AG IA: CPT | Performed by: PHYSICIAN ASSISTANT

## 2024-05-17 PROCEDURE — 83735 ASSAY OF MAGNESIUM: CPT | Performed by: FAMILY MEDICINE

## 2024-05-17 PROCEDURE — 25010000002 CEFTRIAXONE PER 250 MG

## 2024-05-17 PROCEDURE — 1126F AMNT PAIN NOTED NONE PRSNT: CPT | Performed by: PHYSICIAN ASSISTANT

## 2024-05-17 PROCEDURE — 71275 CT ANGIOGRAPHY CHEST: CPT

## 2024-05-17 PROCEDURE — 82805 BLOOD GASES W/O2 SATURATION: CPT

## 2024-05-17 PROCEDURE — 96375 TX/PRO/DX INJ NEW DRUG ADDON: CPT

## 2024-05-17 PROCEDURE — 25010000002 METHYLPREDNISOLONE PER 125 MG

## 2024-05-17 PROCEDURE — 1160F RVW MEDS BY RX/DR IN RCRD: CPT | Performed by: PHYSICIAN ASSISTANT

## 2024-05-17 PROCEDURE — 85652 RBC SED RATE AUTOMATED: CPT

## 2024-05-17 PROCEDURE — 82746 ASSAY OF FOLIC ACID SERUM: CPT | Performed by: PHYSICIAN ASSISTANT

## 2024-05-17 PROCEDURE — 84443 ASSAY THYROID STIM HORMONE: CPT | Performed by: PHYSICIAN ASSISTANT

## 2024-05-17 PROCEDURE — 85025 COMPLETE CBC W/AUTO DIFF WBC: CPT

## 2024-05-17 PROCEDURE — 36415 COLL VENOUS BLD VENIPUNCTURE: CPT

## 2024-05-17 PROCEDURE — 99213 OFFICE O/P EST LOW 20 MIN: CPT | Performed by: PHYSICIAN ASSISTANT

## 2024-05-17 PROCEDURE — 96365 THER/PROPH/DIAG IV INF INIT: CPT

## 2024-05-17 PROCEDURE — 84145 PROCALCITONIN (PCT): CPT

## 2024-05-17 PROCEDURE — 84484 ASSAY OF TROPONIN QUANT: CPT | Performed by: FAMILY MEDICINE

## 2024-05-17 PROCEDURE — 3079F DIAST BP 80-89 MM HG: CPT | Performed by: PHYSICIAN ASSISTANT

## 2024-05-17 PROCEDURE — 93005 ELECTROCARDIOGRAM TRACING: CPT | Performed by: FAMILY MEDICINE

## 2024-05-17 PROCEDURE — 99285 EMERGENCY DEPT VISIT HI MDM: CPT

## 2024-05-17 PROCEDURE — 83880 ASSAY OF NATRIURETIC PEPTIDE: CPT

## 2024-05-17 PROCEDURE — 25810000003 SEPSIS FLUID NS 0.9 % SOLUTION

## 2024-05-17 PROCEDURE — 87637 SARSCOV2&INF A&B&RSV AMP PRB: CPT | Performed by: FAMILY MEDICINE

## 2024-05-17 PROCEDURE — 80053 COMPREHEN METABOLIC PANEL: CPT | Performed by: PHYSICIAN ASSISTANT

## 2024-05-17 PROCEDURE — 82607 VITAMIN B-12: CPT | Performed by: PHYSICIAN ASSISTANT

## 2024-05-17 PROCEDURE — 3075F SYST BP GE 130 - 139MM HG: CPT | Performed by: PHYSICIAN ASSISTANT

## 2024-05-17 PROCEDURE — 1159F MED LIST DOCD IN RCRD: CPT | Performed by: PHYSICIAN ASSISTANT

## 2024-05-17 PROCEDURE — 25510000001 IOPAMIDOL 61 % SOLUTION: Performed by: FAMILY MEDICINE

## 2024-05-17 PROCEDURE — 94799 UNLISTED PULMONARY SVC/PX: CPT

## 2024-05-17 PROCEDURE — 82820 HEMOGLOBIN-OXYGEN AFFINITY: CPT

## 2024-05-17 PROCEDURE — 83605 ASSAY OF LACTIC ACID: CPT

## 2024-05-17 PROCEDURE — 85025 COMPLETE CBC W/AUTO DIFF WBC: CPT | Performed by: FAMILY MEDICINE

## 2024-05-17 PROCEDURE — 86308 HETEROPHILE ANTIBODY SCREEN: CPT | Performed by: PHYSICIAN ASSISTANT

## 2024-05-17 RX ORDER — SODIUM CHLORIDE 0.9 % (FLUSH) 0.9 %
10 SYRINGE (ML) INJECTION AS NEEDED
Status: DISCONTINUED | OUTPATIENT
Start: 2024-05-17 | End: 2024-05-18

## 2024-05-17 RX ORDER — ACETAMINOPHEN 500 MG
500 TABLET ORAL ONCE
Status: COMPLETED | OUTPATIENT
Start: 2024-05-17 | End: 2024-05-17

## 2024-05-17 RX ORDER — METHYLPREDNISOLONE SODIUM SUCCINATE 125 MG/2ML
125 INJECTION, POWDER, LYOPHILIZED, FOR SOLUTION INTRAMUSCULAR; INTRAVENOUS ONCE
Status: COMPLETED | OUTPATIENT
Start: 2024-05-17 | End: 2024-05-17

## 2024-05-17 RX ORDER — IPRATROPIUM BROMIDE AND ALBUTEROL SULFATE 2.5; .5 MG/3ML; MG/3ML
3 SOLUTION RESPIRATORY (INHALATION) ONCE
Status: COMPLETED | OUTPATIENT
Start: 2024-05-17 | End: 2024-05-17

## 2024-05-17 RX ADMIN — IPRATROPIUM BROMIDE AND ALBUTEROL SULFATE 3 ML: 2.5; .5 SOLUTION RESPIRATORY (INHALATION) at 23:23

## 2024-05-17 RX ADMIN — METHYLPREDNISOLONE SODIUM SUCCINATE 125 MG: 125 INJECTION INTRAMUSCULAR; INTRAVENOUS at 22:52

## 2024-05-17 RX ADMIN — SODIUM CHLORIDE 2241 ML: 9 INJECTION, SOLUTION INTRAVENOUS at 22:31

## 2024-05-17 RX ADMIN — IOPAMIDOL 100 ML: 612 INJECTION, SOLUTION INTRAVENOUS at 23:25

## 2024-05-17 RX ADMIN — ACETAMINOPHEN 500 MG: 500 TABLET ORAL at 22:31

## 2024-05-17 RX ADMIN — CEFTRIAXONE SODIUM 2000 MG: 2 INJECTION, POWDER, FOR SOLUTION INTRAMUSCULAR; INTRAVENOUS at 23:53

## 2024-05-17 NOTE — PROGRESS NOTES
[facility]  [address]  [place] [facility] [address]  Dept: [phone]  Dept Fax: [phone]  Loc: [phone]  Loc Fax: [phone]    Love Kellertierra  1950    Follow Up Office Visit Note    History of Present Illness:  Patient is a 73-year-old female in today for a several day worsening history fatigue with diarrhea.  Having some nausea but no vomiting.  Urinating regularly.  No complaints of dehydration.  Patient having poor appetite.  Main symptom though is fatigue.  Patient wants to sleep all the time.    Fatigue  Associated symptoms include fatigue and nausea. Pertinent negatives include no arthralgias, chest pain, chills, congestion, coughing, fever, headaches, myalgias, rash, sore throat or vomiting.       The following portions of the patient's history were reviewed and updated as appropriate: allergies, current medications, past family history, past medical history, past social history, past surgical history, and problem list.    Medications:    Current Outpatient Medications:     atorvastatin (LIPITOR) 10 MG tablet, TAKE 1 TABLET BY MOUTH EVERY DAY, Disp: 90 tablet, Rfl: 0    Subjective  No Known Allergies     Past Medical History:   Diagnosis Date    Arthritis     Hyperlipidemia        Past Surgical History:   Procedure Laterality Date    BREAST LUMPECTOMY      BUNIONECTOMY Left 01/2023    COLONOSCOPY  2013    HYSTERECTOMY  1986    LYMPH NODE BIOPSY      TONSILLECTOMY         Family History   Problem Relation Age of Onset    Stroke Mother     COPD Mother     Vision loss Mother     Cancer Father     COPD Father     Liver disease Brother         Social History     Socioeconomic History    Marital status:    Tobacco Use    Smoking status: Never     Passive exposure: Never    Smokeless tobacco: Never   Vaping Use    Vaping status: Never Used   Substance and Sexual Activity    Alcohol use: Yes     Alcohol/week: 2.0 standard drinks of  "alcohol     Types: 2 Glasses of wine per week     Comment: 2-3    Drug use: Never    Sexual activity: Not Currently     Partners: Male     Birth control/protection: Hysterectomy       Review of Systems   Constitutional:  Positive for fatigue. Negative for activity change, chills, fever and unexpected weight change.   HENT:  Negative for congestion, ear pain, postnasal drip, sinus pressure and sore throat.    Eyes:  Negative for pain, discharge and redness.   Respiratory:  Negative for cough, shortness of breath and wheezing.    Cardiovascular:  Negative for chest pain, palpitations and leg swelling.   Gastrointestinal:  Positive for diarrhea and nausea. Negative for vomiting.   Endocrine: Negative for cold intolerance and heat intolerance.   Genitourinary:  Negative for decreased urine volume and dysuria.   Musculoskeletal:  Negative for arthralgias and myalgias.   Skin:  Negative for rash and wound.   Neurological:  Negative for dizziness, light-headedness and headaches.   Hematological:  Does not bruise/bleed easily.   Psychiatric/Behavioral:  Negative for confusion, dysphoric mood and sleep disturbance. The patient is not nervous/anxious.          Objective  Vitals:    05/17/24 1121   BP: 130/82   BP Location: Left arm   Patient Position: Sitting   Pulse: 78   Temp: 98 °F (36.7 °C)   SpO2: 99%   Weight: 99.8 kg (220 lb)   Height: 170.2 cm (67\")     Body mass index is 34.46 kg/m².     Physical Exam  Physical Exam  Vitals and nursing note reviewed.   Constitutional:       General: She is not in acute distress.     Appearance: She is not ill-appearing.   HENT:      Head: Normocephalic.      Right Ear: Tympanic membrane, ear canal and external ear normal. There is no impacted cerumen.      Left Ear: Tympanic membrane, ear canal and external ear normal. There is no impacted cerumen.      Nose: No congestion or rhinorrhea.      Mouth/Throat:      Mouth: Mucous membranes are moist.      Pharynx: Oropharynx is clear. No " oropharyngeal exudate or posterior oropharyngeal erythema.   Eyes:      General:         Right eye: No discharge.         Left eye: No discharge.      Extraocular Movements: Extraocular movements intact.      Conjunctiva/sclera: Conjunctivae normal.      Pupils: Pupils are equal, round, and reactive to light.   Cardiovascular:      Rate and Rhythm: Normal rate and regular rhythm.      Heart sounds: Normal heart sounds. No murmur heard.     No friction rub. No gallop.   Pulmonary:      Effort: Pulmonary effort is normal. No respiratory distress.      Breath sounds: Normal breath sounds. No wheezing.   Abdominal:      General: Bowel sounds are normal. There is no distension.      Palpations: Abdomen is soft. There is no mass.      Tenderness: There is no abdominal tenderness.   Musculoskeletal:         General: No swelling. Normal range of motion.      Cervical back: Normal range of motion. No tenderness.      Right lower leg: No edema.      Left lower leg: No edema.   Lymphadenopathy:      Cervical: No cervical adenopathy.   Skin:     Findings: No bruising, erythema or rash.   Neurological:      Mental Status: She is oriented to person, place, and time.      Gait: Gait normal.   Psychiatric:         Mood and Affect: Mood normal.         Behavior: Behavior normal.         Thought Content: Thought content normal.         Judgment: Judgment normal.         Diagnostic Data  Procedures    Assessment  Diagnoses and all orders for this visit:    1. Lethargy (Primary)  -     CBC w AUTO Differential; Future  -     Comprehensive Metabolic Panel  -     TSH Rfx On Abnormal To Free T4  -     Vitamin B12 & Folate  -     Mononucleosis Screen  -     POCT SARS-CoV-2 Antigen ADEOLA + Flu    2. Acute diarrhea  -     Stool Culture (Reference Lab) - Stool, Per Rectum; Future  -     Ova & Parasite Examination - Stool, Per Rectum        Plan    1. Lethargy (Primary)- rapid COVID and flu test negative.  Obtain labs per above.    2. Acute  diarrhea- obtain stool studies.  If everything negative, likely acute viral gastroenteritis.  Advised on plenty of hydration.  Advised on brat diet once she can tolerate food.  Advised on signs and symptoms of dehydration and if experiencing any of these go to ER immediately. Patient verbalized understanding of all instructions given and complied.      Return in about 2 weeks (around 5/31/2024) for Recheck.    Karson Banuelos PA-C  05/17/2024

## 2024-05-18 VITALS
RESPIRATION RATE: 18 BRPM | BODY MASS INDEX: 32.65 KG/M2 | SYSTOLIC BLOOD PRESSURE: 121 MMHG | TEMPERATURE: 98.3 F | OXYGEN SATURATION: 92 % | HEIGHT: 67 IN | WEIGHT: 208 LBS | DIASTOLIC BLOOD PRESSURE: 72 MMHG | HEART RATE: 69 BPM

## 2024-05-18 PROBLEM — E78.49 OTHER HYPERLIPIDEMIA: Status: RESOLVED | Noted: 2022-03-23 | Resolved: 2024-05-18

## 2024-05-18 PROBLEM — J18.9 LEFT LOWER LOBE PNEUMONIA: Status: ACTIVE | Noted: 2024-05-18

## 2024-05-18 LAB
ALBUMIN SERPL-MCNC: 3.1 G/DL (ref 3.5–5.2)
ALBUMIN SERPL-MCNC: 3.3 G/DL (ref 3.5–5.2)
ALBUMIN/GLOB SERPL: 1.1 G/DL
ALBUMIN/GLOB SERPL: 1.1 G/DL
ALP SERPL-CCNC: 61 U/L (ref 39–117)
ALP SERPL-CCNC: 68 U/L (ref 39–117)
ALT SERPL W P-5'-P-CCNC: 26 U/L (ref 1–33)
ALT SERPL W P-5'-P-CCNC: 28 U/L (ref 1–33)
ANION GAP SERPL CALCULATED.3IONS-SCNC: 12.5 MMOL/L (ref 5–15)
ANION GAP SERPL CALCULATED.3IONS-SCNC: 15.9 MMOL/L (ref 5–15)
AST SERPL-CCNC: 33 U/L (ref 1–32)
AST SERPL-CCNC: 35 U/L (ref 1–32)
BASOPHILS # BLD AUTO: 0.01 10*3/MM3 (ref 0–0.2)
BASOPHILS # BLD AUTO: 0.01 10*3/MM3 (ref 0–0.2)
BASOPHILS NFR BLD AUTO: 0.1 % (ref 0–1.5)
BASOPHILS NFR BLD AUTO: 0.1 % (ref 0–1.5)
BILIRUB SERPL-MCNC: 0.3 MG/DL (ref 0–1.2)
BILIRUB SERPL-MCNC: 0.4 MG/DL (ref 0–1.2)
BUN SERPL-MCNC: 17 MG/DL (ref 8–23)
BUN SERPL-MCNC: 18 MG/DL (ref 8–23)
BUN/CREAT SERPL: 28.6 (ref 7–25)
BUN/CREAT SERPL: 29.3 (ref 7–25)
CALCIUM SPEC-SCNC: 7.5 MG/DL (ref 8.6–10.5)
CALCIUM SPEC-SCNC: 8.1 MG/DL (ref 8.6–10.5)
CHLORIDE SERPL-SCNC: 103 MMOL/L (ref 98–107)
CHLORIDE SERPL-SCNC: 97 MMOL/L (ref 98–107)
CO2 SERPL-SCNC: 17.1 MMOL/L (ref 22–29)
CO2 SERPL-SCNC: 18.5 MMOL/L (ref 22–29)
CREAT SERPL-MCNC: 0.58 MG/DL (ref 0.57–1)
CREAT SERPL-MCNC: 0.63 MG/DL (ref 0.57–1)
D-LACTATE SERPL-SCNC: 0.8 MMOL/L (ref 0.5–2)
DEPRECATED RDW RBC AUTO: 42.8 FL (ref 37–54)
DEPRECATED RDW RBC AUTO: 42.9 FL (ref 37–54)
EGFRCR SERPLBLD CKD-EPI 2021: 95 ML/MIN/1.73
EGFRCR SERPLBLD CKD-EPI 2021: 96.9 ML/MIN/1.73
EOSINOPHIL # BLD AUTO: 0 10*3/MM3 (ref 0–0.4)
EOSINOPHIL # BLD AUTO: 0 10*3/MM3 (ref 0–0.4)
EOSINOPHIL NFR BLD AUTO: 0 % (ref 0.3–6.2)
EOSINOPHIL NFR BLD AUTO: 0 % (ref 0.3–6.2)
ERYTHROCYTE [DISTWIDTH] IN BLOOD BY AUTOMATED COUNT: 12.9 % (ref 12.3–15.4)
ERYTHROCYTE [DISTWIDTH] IN BLOOD BY AUTOMATED COUNT: 12.9 % (ref 12.3–15.4)
FOLATE SERPL-MCNC: 19.1 NG/ML (ref 4.78–24.2)
GLOBULIN UR ELPH-MCNC: 2.7 GM/DL
GLOBULIN UR ELPH-MCNC: 2.9 GM/DL
GLUCOSE SERPL-MCNC: 120 MG/DL (ref 65–99)
GLUCOSE SERPL-MCNC: 153 MG/DL (ref 65–99)
HCT VFR BLD AUTO: 32 % (ref 34–46.6)
HCT VFR BLD AUTO: 34.3 % (ref 34–46.6)
HGB BLD-MCNC: 11 G/DL (ref 12–15.9)
HGB BLD-MCNC: 11.8 G/DL (ref 12–15.9)
IMM GRANULOCYTES # BLD AUTO: 0.03 10*3/MM3 (ref 0–0.05)
IMM GRANULOCYTES # BLD AUTO: 0.04 10*3/MM3 (ref 0–0.05)
IMM GRANULOCYTES NFR BLD AUTO: 0.3 % (ref 0–0.5)
IMM GRANULOCYTES NFR BLD AUTO: 0.4 % (ref 0–0.5)
LYMPHOCYTES # BLD AUTO: 0.36 10*3/MM3 (ref 0.7–3.1)
LYMPHOCYTES # BLD AUTO: 0.37 10*3/MM3 (ref 0.7–3.1)
LYMPHOCYTES NFR BLD AUTO: 3.1 % (ref 19.6–45.3)
LYMPHOCYTES NFR BLD AUTO: 3.5 % (ref 19.6–45.3)
MCH RBC QN AUTO: 30.6 PG (ref 26.6–33)
MCH RBC QN AUTO: 30.6 PG (ref 26.6–33)
MCHC RBC AUTO-ENTMCNC: 34.4 G/DL (ref 31.5–35.7)
MCHC RBC AUTO-ENTMCNC: 34.4 G/DL (ref 31.5–35.7)
MCV RBC AUTO: 88.9 FL (ref 79–97)
MCV RBC AUTO: 88.9 FL (ref 79–97)
MONOCYTES # BLD AUTO: 0.34 10*3/MM3 (ref 0.1–0.9)
MONOCYTES # BLD AUTO: 0.5 10*3/MM3 (ref 0.1–0.9)
MONOCYTES NFR BLD AUTO: 3.2 % (ref 5–12)
MONOCYTES NFR BLD AUTO: 4.3 % (ref 5–12)
NEUTROPHILS NFR BLD AUTO: 10.81 10*3/MM3 (ref 1.7–7)
NEUTROPHILS NFR BLD AUTO: 9.87 10*3/MM3 (ref 1.7–7)
NEUTROPHILS NFR BLD AUTO: 92.2 % (ref 42.7–76)
NEUTROPHILS NFR BLD AUTO: 92.8 % (ref 42.7–76)
PLATELET # BLD AUTO: 136 10*3/MM3 (ref 140–450)
PLATELET # BLD AUTO: 139 10*3/MM3 (ref 140–450)
PMV BLD AUTO: 11.2 FL (ref 6–12)
PMV BLD AUTO: 11.3 FL (ref 6–12)
POTASSIUM SERPL-SCNC: 3.5 MMOL/L (ref 3.5–5.2)
POTASSIUM SERPL-SCNC: 3.7 MMOL/L (ref 3.5–5.2)
PROCALCITONIN SERPL-MCNC: 1.22 NG/ML (ref 0–0.25)
PROCALCITONIN SERPL-MCNC: 1.34 NG/ML (ref 0–0.25)
PROT SERPL-MCNC: 5.8 G/DL (ref 6–8.5)
PROT SERPL-MCNC: 6.2 G/DL (ref 6–8.5)
RBC # BLD AUTO: 3.6 10*6/MM3 (ref 3.77–5.28)
RBC # BLD AUTO: 3.86 10*6/MM3 (ref 3.77–5.28)
SODIUM SERPL-SCNC: 130 MMOL/L (ref 136–145)
SODIUM SERPL-SCNC: 134 MMOL/L (ref 136–145)
VIT B12 BLD-MCNC: 637 PG/ML (ref 211–946)
WBC NRBC COR # BLD AUTO: 10.63 10*3/MM3 (ref 3.4–10.8)
WBC NRBC COR # BLD AUTO: 11.71 10*3/MM3 (ref 3.4–10.8)

## 2024-05-18 PROCEDURE — 94799 UNLISTED PULMONARY SVC/PX: CPT

## 2024-05-18 PROCEDURE — 94664 DEMO&/EVAL PT USE INHALER: CPT

## 2024-05-18 PROCEDURE — 25010000002 AZITHROMYCIN PER 500 MG: Performed by: PHYSICIAN ASSISTANT

## 2024-05-18 PROCEDURE — 25810000003 SODIUM CHLORIDE 0.9 % SOLUTION: Performed by: PHYSICIAN ASSISTANT

## 2024-05-18 PROCEDURE — 94761 N-INVAS EAR/PLS OXIMETRY MLT: CPT

## 2024-05-18 PROCEDURE — 25010000002 AZITHROMYCIN PER 500 MG

## 2024-05-18 PROCEDURE — 25010000002 METHYLPREDNISOLONE PER 125 MG: Performed by: PHYSICIAN ASSISTANT

## 2024-05-18 PROCEDURE — 25010000002 CEFTRIAXONE PER 250 MG: Performed by: PHYSICIAN ASSISTANT

## 2024-05-18 PROCEDURE — 96375 TX/PRO/DX INJ NEW DRUG ADDON: CPT

## 2024-05-18 PROCEDURE — 80053 COMPREHEN METABOLIC PANEL: CPT | Performed by: PHYSICIAN ASSISTANT

## 2024-05-18 PROCEDURE — 25810000003 SODIUM CHLORIDE 0.9 % SOLUTION 250 ML FLEX CONT

## 2024-05-18 PROCEDURE — 84145 PROCALCITONIN (PCT): CPT | Performed by: PHYSICIAN ASSISTANT

## 2024-05-18 PROCEDURE — 25810000003 SODIUM CHLORIDE 0.9 % SOLUTION 250 ML FLEX CONT: Performed by: PHYSICIAN ASSISTANT

## 2024-05-18 PROCEDURE — 96376 TX/PRO/DX INJ SAME DRUG ADON: CPT

## 2024-05-18 PROCEDURE — 83605 ASSAY OF LACTIC ACID: CPT | Performed by: PHYSICIAN ASSISTANT

## 2024-05-18 PROCEDURE — 85025 COMPLETE CBC W/AUTO DIFF WBC: CPT | Performed by: PHYSICIAN ASSISTANT

## 2024-05-18 RX ORDER — SODIUM CHLORIDE 9 MG/ML
40 INJECTION, SOLUTION INTRAVENOUS AS NEEDED
Status: DISCONTINUED | OUTPATIENT
Start: 2024-05-18 | End: 2024-05-18 | Stop reason: HOSPADM

## 2024-05-18 RX ORDER — METHYLPREDNISOLONE SODIUM SUCCINATE 125 MG/2ML
80 INJECTION, POWDER, LYOPHILIZED, FOR SOLUTION INTRAMUSCULAR; INTRAVENOUS EVERY 24 HOURS
Status: DISCONTINUED | OUTPATIENT
Start: 2024-05-18 | End: 2024-05-18 | Stop reason: HOSPADM

## 2024-05-18 RX ORDER — ATORVASTATIN CALCIUM 10 MG/1
10 TABLET, FILM COATED ORAL DAILY
Status: DISCONTINUED | OUTPATIENT
Start: 2024-05-18 | End: 2024-05-18 | Stop reason: HOSPADM

## 2024-05-18 RX ORDER — SODIUM CHLORIDE 9 MG/ML
125 INJECTION, SOLUTION INTRAVENOUS CONTINUOUS
Status: DISCONTINUED | OUTPATIENT
Start: 2024-05-18 | End: 2024-05-18

## 2024-05-18 RX ORDER — FAMOTIDINE 20 MG/1
40 TABLET, FILM COATED ORAL DAILY
Status: DISCONTINUED | OUTPATIENT
Start: 2024-05-18 | End: 2024-05-18 | Stop reason: HOSPADM

## 2024-05-18 RX ORDER — SODIUM CHLORIDE 0.9 % (FLUSH) 0.9 %
10 SYRINGE (ML) INJECTION EVERY 12 HOURS SCHEDULED
Status: DISCONTINUED | OUTPATIENT
Start: 2024-05-18 | End: 2024-05-18 | Stop reason: HOSPADM

## 2024-05-18 RX ORDER — ALBUTEROL SULFATE 2.5 MG/3ML
2.5 SOLUTION RESPIRATORY (INHALATION) EVERY 4 HOURS PRN
Status: DISCONTINUED | OUTPATIENT
Start: 2024-05-18 | End: 2024-05-18 | Stop reason: HOSPADM

## 2024-05-18 RX ORDER — IPRATROPIUM BROMIDE AND ALBUTEROL SULFATE 2.5; .5 MG/3ML; MG/3ML
3 SOLUTION RESPIRATORY (INHALATION)
Status: DISCONTINUED | OUTPATIENT
Start: 2024-05-18 | End: 2024-05-18 | Stop reason: HOSPADM

## 2024-05-18 RX ORDER — LOPERAMIDE HYDROCHLORIDE 2 MG/1
4 CAPSULE ORAL 4 TIMES DAILY PRN
Status: DISCONTINUED | OUTPATIENT
Start: 2024-05-18 | End: 2024-05-18 | Stop reason: HOSPADM

## 2024-05-18 RX ORDER — ONDANSETRON 2 MG/ML
4 INJECTION INTRAMUSCULAR; INTRAVENOUS EVERY 6 HOURS PRN
Status: DISCONTINUED | OUTPATIENT
Start: 2024-05-18 | End: 2024-05-18 | Stop reason: HOSPADM

## 2024-05-18 RX ORDER — SODIUM CHLORIDE 0.9 % (FLUSH) 0.9 %
10 SYRINGE (ML) INJECTION AS NEEDED
Status: DISCONTINUED | OUTPATIENT
Start: 2024-05-18 | End: 2024-05-18 | Stop reason: HOSPADM

## 2024-05-18 RX ORDER — CEFDINIR 300 MG/1
300 CAPSULE ORAL 2 TIMES DAILY
Qty: 14 CAPSULE | Refills: 0 | Status: SHIPPED | OUTPATIENT
Start: 2024-05-18 | End: 2024-05-23 | Stop reason: HOSPADM

## 2024-05-18 RX ADMIN — LOPERAMIDE HYDROCHLORIDE 4 MG: 2 CAPSULE ORAL at 08:48

## 2024-05-18 RX ADMIN — FAMOTIDINE 40 MG: 20 TABLET, FILM COATED ORAL at 08:36

## 2024-05-18 RX ADMIN — AZITHROMYCIN MONOHYDRATE 500 MG: 500 INJECTION, POWDER, LYOPHILIZED, FOR SOLUTION INTRAVENOUS at 00:08

## 2024-05-18 RX ADMIN — METHYLPREDNISOLONE SODIUM SUCCINATE 80 MG: 125 INJECTION INTRAMUSCULAR; INTRAVENOUS at 08:36

## 2024-05-18 RX ADMIN — ATORVASTATIN CALCIUM 10 MG: 10 TABLET, FILM COATED ORAL at 08:36

## 2024-05-18 RX ADMIN — CEFTRIAXONE SODIUM 2000 MG: 2 INJECTION, POWDER, FOR SOLUTION INTRAMUSCULAR; INTRAVENOUS at 08:42

## 2024-05-18 RX ADMIN — Medication 10 ML: at 08:42

## 2024-05-18 RX ADMIN — SODIUM CHLORIDE 125 ML/HR: 9 INJECTION, SOLUTION INTRAVENOUS at 01:16

## 2024-05-18 RX ADMIN — AZITHROMYCIN MONOHYDRATE 500 MG: 500 INJECTION, POWDER, LYOPHILIZED, FOR SOLUTION INTRAVENOUS at 08:37

## 2024-05-18 RX ADMIN — Medication 10 ML: at 01:16

## 2024-05-18 RX ADMIN — IPRATROPIUM BROMIDE AND ALBUTEROL SULFATE 3 ML: 2.5; .5 SOLUTION RESPIRATORY (INHALATION) at 07:44

## 2024-05-18 RX ADMIN — IPRATROPIUM BROMIDE AND ALBUTEROL SULFATE 3 ML: 2.5; .5 SOLUTION RESPIRATORY (INHALATION) at 03:12

## 2024-05-18 NOTE — PLAN OF CARE
Problem: Adult Inpatient Plan of Care  Goal: Plan of Care Review  Outcome: Ongoing, Progressing     Problem: ARDS (Acute Respiratory Distress Syndrome)  Goal: Effective Oxygenation  Outcome: Ongoing, Progressing     Problem: Fatigue  Goal: Improved Activity Tolerance

## 2024-05-18 NOTE — ED NOTES
Love Mackenzie    Nursing Report ED to Floor:  Mental status: GCS 14; eye opens to voice, oriented x4  Ambulatory status: x1-2 assist, has not been oob during ED stay  Oxygen Therapy:  2LNC started in ED  Cardiac Rhythm: NSR to ST  Admitted from: home  Safety Concerns:  none known  Social Issues: none known  ED Room #:  14    ED Nurse Phone Extension - [phone] or may call [phone].      HPI:   Chief Complaint   Patient presents with    Weakness - Generalized       Past Medical History:  Past Medical History:   Diagnosis Date    Arthritis     Hyperlipidemia         Past Surgical History:  Past Surgical History:   Procedure Laterality Date    BREAST LUMPECTOMY      BUNIONECTOMY Left 01/2023    COLONOSCOPY  2013    HYSTERECTOMY  1986    LYMPH NODE BIOPSY      TONSILLECTOMY          Admitting Doctor:   No admitting provider for patient encounter.    Consulting Provider(s):  Consults       No orders found from 4/18/2024 to 5/18/2024.             Admitting Diagnosis:   The primary encounter diagnosis was Pneumonia of left lower lobe due to infectious organism. A diagnosis of Acute hypoxic respiratory failure was also pertinent to this visit.    Most Recent Vitals:   Vitals:    05/17/24 2322 05/17/24 2323 05/17/24 2328 05/17/24 2330   BP: 134/73   124/75   BP Location:       Patient Position:       Pulse: 93 92 97 94   Resp:  23     Temp:       TempSrc:       SpO2: 94% 94% 96%    Weight:       Height:           Active LDAs/IV Access:   Lines, Drains & Airways       Active LDAs       Name Placement date Placement time Site Days    Peripheral IV 05/17/24 2220 Left;Posterior Hand 05/17/24 2220  Hand  less than 1    Peripheral IV 05/17/24 2310 Left Antecubital 05/17/24  2310  Antecubital  less than 1                    Labs (abnormal labs have a star):   Labs Reviewed   COMPREHENSIVE METABOLIC PANEL - Abnormal; Notable for the following components:       Result Value    Glucose 120 (*)     Sodium 126 (*)     Chloride 91 (*)      CO2 17.8 (*)     AST (SGOT) 38 (*)     BUN/Creatinine Ratio 25.9 (*)     Anion Gap 17.2 (*)     All other components within normal limits    Narrative:     GFR Normal >60  Chronic Kidney Disease <60  Kidney Failure <15    The GFR formula is only valid for adults with stable renal function between ages 18 and 70.   SINGLE HS TROPONIN T - Abnormal; Notable for the following components:    HS Troponin T 18 (*)     All other components within normal limits   CBC WITH AUTO DIFFERENTIAL - Abnormal; Notable for the following components:    WBC 12.86 (*)     Neutrophil % 87.1 (*)     Lymphocyte % 5.4 (*)     Eosinophil % 0.0 (*)     Neutrophils, Absolute 11.21 (*)     Immature Grans, Absolute 0.06 (*)     All other components within normal limits   PROCALCITONIN - Abnormal; Notable for the following components:    Procalcitonin 1.23 (*)     All other components within normal limits   SEDIMENTATION RATE - Abnormal; Notable for the following components:    Sed Rate 56 (*)     All other components within normal limits   C-REACTIVE PROTEIN - Abnormal; Notable for the following components:    C-Reactive Protein 32.10 (*)     All other components within normal limits   BLOOD GAS, VENOUS W/CO-OXIMETRY - Abnormal; Notable for the following components:    pH, Venous 7.449 (*)     pCO2, Venous 30.5 (*)     HCO3, Venous 21.1 (*)     Hemoglobin, Blood Gas 13.1 (*)     All other components within normal limits   COVID-19/FLUA&B/RSV, NP SWAB IN TRANSPORT MEDIA 1 HR TAT - Normal    Narrative:     Fact sheet for providers: https://www.fda.gov/media/928580/download    Fact sheet for patients: https://www.fda.gov/media/244544/download    Test performed by PCR.   MAGNESIUM - Normal   LACTIC ACID, PLASMA - Normal   BNP (IN-HOUSE) - Normal    Narrative:     This assay is used as an aid in the diagnosis of individuals suspected of having heart failure. It can be used as an aid in the diagnosis of acute decompensated heart failure (ADHF) in  patients presenting with signs and symptoms of ADHF to the emergency department (ED). In addition, NT-proBNP of <300 pg/mL indicates ADHF is not likely.    Age Range Result Interpretation  NT-proBNP Concentration (pg/mL:      <50             Positive            >450                   Gray                 300-450                    Negative             <300    50-75           Positive            >900                  Gray                300-900                  Negative            <300      >75             Positive            >1800                  Gray                300-1800                  Negative            <300   BLOOD CULTURE   BLOOD CULTURE   RAINBOW DRAW    Narrative:     The following orders were created for panel order [place] Draw.  Procedure                               Abnormality         Status                     ---------                               -----------         ------                     Green Top (Gel)[[id number]]                                  Final result               Lavender Top[[id number]]                                     Final result               Gold Top - SST[[id number]]                                   Final result               Hugo Top[[id number]]                                         Final result               Light Blue Top[[id number]]                                   Final result                 Please view results for these tests on the individual orders.   BLOOD GAS, VENOUS   URINALYSIS W/ MICROSCOPIC IF INDICATED (NO CULTURE)   CBC AND DIFFERENTIAL    Narrative:     The following orders were created for panel order CBC & Differential.  Procedure                               Abnormality         Status                     ---------                               -----------         ------                     CBC Auto Differential[[id number]]        Abnormal            Final result                 Please view results for these tests on the individual orders.   GREEN  TOP   LAVENDER TOP   GOLD TOP - SST   GRAY TOP   LIGHT BLUE TOP       Meds Given in ED:   Medications   sodium chloride 0.9 % flush 10 mL (has no administration in time range)   cefTRIAXone (ROCEPHIN) 2,000 mg in sodium chloride 0.9 % 100 mL MBP (2,000 mg Intravenous New Bag 5/17/24 2353)   azithromycin (ZITHROMAX) 500 mg in sodium chloride 0.9 % 250 mL IVPB-VTB (has no administration in time range)   sepsis fluid NS 0.9 % bolus 2,241 mL (2,241 mL Intravenous New Bag 5/17/24 2231)   iopamidol (ISOVUE-300) 61 % injection 100 mL (100 mL Intravenous Given 5/17/24 2325)   acetaminophen (TYLENOL) tablet 500 mg (500 mg Oral Given 5/17/24 2231)   ipratropium-albuterol (DUO-NEB) nebulizer solution 3 mL (3 mL Nebulization Given 5/17/24 2323)   methylPREDNISolone sodium succinate (SOLU-Medrol) injection 125 mg (125 mg Intravenous Given 5/17/24 2252)           Last NIH score:                                                          Dysphagia screening results:  Patient Factors Component (Dysphagia:Stroke or Rule-out)  Best Eye Response: 4-->(E4) spontaneous (05/17/24 2224)  Best Motor Response: 6-->(M6) obeys commands (05/17/24 2224)  Best Verbal Response: 5-->(V5) oriented (05/17/24 2224)  Mary Coma Scale Score: 15 (05/17/24 2224)     Mary Coma Scale:  No data recorded     CIWA:        Restraint Type:            Isolation Status:  No active isolations

## 2024-05-18 NOTE — DISCHARGE SUMMARY
Patient Name: Love Mackenzie  : 1950  MRN: [id number]    Date of Admission: 2024  Date of Discharge:  24   Primary Care Physician: Silvia Garcia MD      Presenting Problem:   Left lower lobe pneumonia [J18.9]    Active and Resolved Hospital Problems:  Active Hospital Problems    Diagnosis POA    **Left lower lobe pneumonia [J18.9] Yes      Resolved Hospital Problems   No resolved problems to display.         Hospital Course:     Patient is a 73-year-old female with past medical history noted below who presents with 3 days of worsening fatigue, poor appetite, diarrhea, and then cough and shortness of breath the last 2 days . patient states she is chills as well.  Patient denies any pain including chest pain or abdominal pain.  States she feels very drowsy.  Patient denies urinary symptoms, black tarry stool, hematemesis, hematochezia. Patient hypoxic on presentation to 88% with room air.  Patient placed on 2L NC    #CTA chest negative for PE but does show left lower lobe infiltrate with adjacent small effusion consistent with pneumonia.    #Patient given duo neb, solu-medrol, along with 2G Rocephin and started on 500 mg Zithromax.     #Patient had 2 minute walk test this morning with an Sp02 of 92% therefore patient will not need supplemental oxygen at discharge.      #Patient discharged home on 300 mg Omnicef for 1 week.     Nurses Notes reviewed and agree, including vitals, allergies, social history and prior medical history.     REVIEW OF SYSTEMS: All systems reviewed and not pertinent unless noted.  Review of Systems   Constitutional:  Positive for fatigue.   Respiratory: Negative.     Cardiovascular: Negative.    Gastrointestinal: Negative.    Genitourinary: Negative.    Musculoskeletal: Negative.    Neurological: Negative.    Psychiatric/Behavioral: Negative.         Past Medical History:   Diagnosis Date    Arthritis     Hyperlipidemia        Allergies:    Patient has no  "known allergies.      Past Surgical History:   Procedure Laterality Date    BREAST LUMPECTOMY      BUNIONECTOMY Left 01/2023    COLONOSCOPY  2013    HYSTERECTOMY  1986    LYMPH NODE BIOPSY      TONSILLECTOMY           Social History     Socioeconomic History    Marital status:    Tobacco Use    Smoking status: Never     Passive exposure: Never    Smokeless tobacco: Never   Vaping Use    Vaping status: Never Used   Substance and Sexual Activity    Alcohol use: Yes     Alcohol/week: 2.0 standard drinks of alcohol     Types: 2 Glasses of wine per week     Comment: 2-3    Drug use: Never    Sexual activity: Not Currently     Partners: Male     Birth control/protection: Hysterectomy         Family History   Problem Relation Age of Onset    Stroke Mother     COPD Mother     Vision loss Mother     Cancer Father     COPD Father     Liver disease Brother        Objective  Physical Exam:  /72 (BP Location: Right arm, Patient Position: Lying)   Pulse 69   Temp 98.3 °F (36.8 °C) (Oral)   Resp 18   Ht 170.2 cm (67\")   Wt 94.3 kg (208 lb)   SpO2 92%   BMI 32.58 kg/m²      Physical Exam  Constitutional:       Appearance: Normal appearance.   Cardiovascular:      Rate and Rhythm: Normal rate and regular rhythm.   Pulmonary:      Effort: Pulmonary effort is normal.      Breath sounds: Normal breath sounds.   Abdominal:      General: Abdomen is flat.      Palpations: Abdomen is soft.   Musculoskeletal:         General: Normal range of motion.   Skin:     General: Skin is warm and dry.   Neurological:      General: No focal deficit present.      Mental Status: She is alert and oriented to person, place, and time.   Psychiatric:         Mood and Affect: Mood normal.         Behavior: Behavior normal.         Procedures    CT Abdomen Pelvis With Contrast    Result Date: 5/17/2024  CT ABDOMEN PELVIS W CONTRAST Date of Exam: 5/17/2024 10:53 PM EDT Indication: diarrhea. Comparison: None available. Technique: Axial CT " images were obtained of the abdomen and pelvis following the uneventful intravenous administration of 90 mL Isovue-370. Reconstructed coronal and sagittal images were also obtained. Automated exposure control and iterative construction methods were used. Findings: Lung Bases:   There is left lower lobe airspace disease consistent with pneumonia. There is a trace pleural effusion on the left. Liver: Liver is normal in size and CT density. No focal lesions. Biliary/Gallbladder:  There is density change within the gallbladder which may be small stones or sludge. Spleen: Spleen is normal in size and CT density. Pancreas:  Pancreas is normal. There is no evidence of pancreatic mass or peripancreatic fluid. Kidneys:  Kidneys are normal in size. There are no stones or hydronephrosis. Adrenals:  Adrenal glands are unremarkable. Retroperitoneal/Lymph Nodes/Vasculature:  No retroperitoneal adenopathy is identified. Gastrointestinal/Mesentery:  The bowel loops are non-dilated without wall thickening or mass. There is diverticulosis without diverticulitis. The appendix appears within normal limits. No evidence of obstruction. No free air. No mesenteric fluid collections identified. Bladder:  The bladder is normal. Genital:   Patient is status post hysterectomy.       Bony Structures:   Visualized bony structures are consistent with the patient's age.     Impression: Impression: Left lower lobe pneumonia. No acute abdominal or pelvic abnormality. Electronically Signed: Truong Perez MD  5/17/2024 11:37 PM EDT  Workstation ID: [id number]    CT Angiogram Chest Pulmonary Embolism    Result Date: 5/17/2024  CT ANGIOGRAM CHEST PULMONARY EMBOLISM Date of Exam: 5/17/2024 10:53 PM EDT Indication: hypoxic. Comparison: None available. Technique: Axial CT images were obtained of the chest after the uneventful intravenous administration of 90 mL Isovue-370 utilizing pulmonary embolism protocol.  Reconstructed coronal and sagittal images were  also obtained. Automated exposure control and  iterative construction methods were used. Findings: Pulmonary arteries: Adequate opacification of the pulmonary arteries. No evidence of acute pulmonary embolism. Lungs and Pleura: There is diffuse consolidation with air bronchograms involving the left lower lobe. There is a small pleural effusion. There is no pulmonary mass. There are no suspicious pulmonary nodules. Mediastinum/Missy: No mediastinal or hilar lymphadenopathy. Lymph nodes: No axillary or supraclavicular adenopathy. Cardiovascular: The cardiac chambers are within normal limits. The pericardium is normal. The aorta and its arch branch vessels are unremarkable.   Upper Abdomen: The upper abdominal contents are unremarkable.      Bones and Soft Tissue: No suspicious osseous lesion.     Impression: Impression: Diffuse left lower lobe infiltrate with adjacent small effusion. The findings are consistent with pneumonia. There is no pulmonary embolism. Electronically Signed: Truong Preez MD  5/17/2024 11:33 PM EDT  Workstation ID: [id number]          Lab 05/18/24  0117 05/17/24 2206   LACTATE 0.8 1.8       Site   Date Value Ref Range Status   05/17/2024 Nurse/Dr Draw  Final     Hemoglobin, Blood Gas   Date Value Ref Range Status   05/17/2024 13.1 (L) 14 - 18 g/dL Final     Oxyhemoglobin Venous   Date Value Ref Range Status   05/17/2024 64.4 % Final     Methemoglobin Venous   Date Value Ref Range Status   05/17/2024 0.7 % Final     Carboxyhemoglobin Venous   Date Value Ref Range Status   05/17/2024 1.1 % Final     CO2 Content   Date Value Ref Range Status   05/17/2024 22.1 22 - 33 mmol/L Final     Barometric Pressure for Blood Gas   Date Value Ref Range Status   05/17/2024   Final     Comment:     N/A     Modality   Date Value Ref Range Status   05/17/2024 Nasal Cannula  Final     FIO2   Date Value Ref Range Status   05/17/2024 28 % Final       Results from last 7 days   Lab Units 05/17/24  2205   HSTROP T ng/L 18*        Results from last 7 days   Lab Units 05/18/24  0632 05/18/24  0117 05/17/24  2205   WBC 10*3/mm3 10.63 11.71* 12.86*   HEMOGLOBIN g/dL 11.8* 11.0* 13.1   HEMATOCRIT % 34.3 32.0* 37.8   PLATELETS 10*3/mm3 139* 136* 160       Results from last 7 days   Lab Units 05/18/24  0632 05/18/24  0117 05/17/24  2205   SODIUM mmol/L 134* 130* 126*   POTASSIUM mmol/L 3.7 3.5 3.6   CHLORIDE mmol/L 103 97* 91*   CO2 mmol/L 18.5* 17.1* 17.8*   BUN mg/dL 17 18 22   CREATININE mg/dL 0.58 0.63 0.85   CALCIUM mg/dL 8.1* 7.5* 8.8   BILIRUBIN mg/dL 0.3 0.4 0.5   ALK PHOS U/L 68 61 77   ALT (SGPT) U/L 28 26 31   AST (SGOT) U/L 35* 33* 38*   GLUCOSE mg/dL 153* 120* 120*       Lab Results   Component Value Date    CHOL 144 11/08/2023    TRIG 83 11/08/2023    HDL 57 11/08/2023    LDL 71 11/08/2023             ED Disposition       ED Disposition   Send to Specialty Department    Condition   --    Comment   Level of Care: Telemetry [5]   Diagnosis: Left lower lobe pneumonia [192071]   Patient Class: Outpatient [102]                  Discharge Medication List:      Your medication list        START taking these medications        Instructions Last Dose Given Next Dose Due   cefdinir 300 MG capsule  Commonly known as: OMNICEF      Take 1 capsule by mouth 2 (Two) Times a Day.              CONTINUE taking these medications        Instructions Last Dose Given Next Dose Due   atorvastatin 10 MG tablet  Commonly known as: LIPITOR      TAKE 1 TABLET BY MOUTH EVERY DAY                 Where to Get Your Medications        These medications were sent to [id number] - [place], [place] - [address] [facility] [address] - [phone]  - [phone]   [address] [address]      Phone: [phone]   cefdinir 300 MG capsule          BARTOLOME Guthrie   05/18/24   10:09 EDT       Time Spent on Discharge:  I spent  45  minutes on this discharge activity which included: face-to-face encounter with the patient,  reviewing the data in the system, coordination of the care with the nursing staff as well as consultants, documentation, and entering orders.

## 2024-05-18 NOTE — PROGRESS NOTES
Love Mackenzie   1950   [id number]     Chief Complaint   Patient presents with    Weakness - Generalized        Pt. Presented to the ED with shortness of breath that started this evening. No history of breathing difficulty or lung problems in the past.  She has recently seen her physician concerning fatigue and diarrhea.  She reports they ordered labs but she is unsure how they have returned.  Patient is never out of her oxygen at home.  Patient does not believe she has been exposed to COVID recently.  Patient denies chest pain, headache, neck pain, pain, vomiting.  In the emergency department patient was noted to be labored breathing and was placed on 2 L of oxygen when her oxygen saturation dropped to 85%.  Patient was placed on 2 L and stabilized with her pulse ox increasing to 94%.  CT PE scan was negative for PE.  Patient was placed in the CDU for observation in outpatient setting    Review of Systems   Respiratory:  Positive for shortness of breath and wheezing.    All other systems reviewed and are negative.    Temp:  [98 °F (36.7 °C)-100.6 °F (38.1 °C)] 98.9 °F (37.2 °C)  Heart Rate:  [] 90  Resp:  [20-27] 27  BP: (120-141)/(67-87) 123/71  No intake/output data recorded.  I/O this shift:  In: 350 [IV Piggyback:350]  Out: -     Active Ambulatory Problems     Diagnosis Date Noted    Other hyperlipidemia 03/23/2022    Benign essential HTN 03/23/2022     Resolved Ambulatory Problems     Diagnosis Date Noted    No Resolved Ambulatory Problems     Past Medical History:   Diagnosis Date    Arthritis     Hyperlipidemia         Past Surgical History:   Procedure Laterality Date    BREAST LUMPECTOMY      BUNIONECTOMY Left 01/2023    COLONOSCOPY  2013    HYSTERECTOMY  1986    LYMPH NODE BIOPSY      TONSILLECTOMY        Home medications:   - atorvastatin     Physical Exam  Vitals and nursing note reviewed.   Constitutional:       Appearance: Normal appearance. She is normal weight.   HENT:      Head:  Normocephalic.      Nose: Nose normal.   Eyes:      Pupils: Pupils are equal, round, and reactive to light.   Cardiovascular:      Rate and Rhythm: Normal rate and regular rhythm.   Pulmonary:      Effort: Pulmonary effort is normal.      Breath sounds: Wheezing present.   Abdominal:      General: Abdomen is flat.      Tenderness: There is no abdominal tenderness.   Musculoskeletal:         General: Normal range of motion.   Skin:     General: Skin is warm and dry.   Neurological:      General: No focal deficit present.      Mental Status: She is alert and oriented to person, place, and time. Mental status is at baseline.   Psychiatric:         Mood and Affect: Mood normal.         Behavior: Behavior normal.         Thought Content: Thought content normal.         Judgment: Judgment normal.       Patient Active Problem List   Diagnosis    Other hyperlipidemia    Benign essential HTN    Left lower lobe pneumonia        Left lower lobe pneumonia  - Continuous pulse ox monitoring  - Nasal cannula via 2 L continuously  - Azithromycin and Rocephin ordered every 24 hours  - DuoNebs every 4 hours  - Albuterol nebulizer every 4 hours as needed shortness of breath, wheezing  - Solu-Medrol every 24 hours  - Repeat chest x-ray, procalcitonin, lactic acid in the a.m.    Hyperlipidemia:   - continue atorvastatin    Hypertension:   - monitor blood pressure and give medications if neccessary     Neena Giang PA-C  01:14 EDT   05/17/2024

## 2024-05-18 NOTE — [facility] PROVIDER NOTE
Subjective   History of Present Illness  Patient is a 73-year-old female with past medical history noted below who presents with 3 days of worsening fatigue, poor appetite, diarrhea, and then cough and shortness of breath the last 2 days . patient states she is chills as well.  Patient denies any pain including chest pain or abdominal pain.  States she feels very drowsy.  Patient denies urinary symptoms, black tarry stool, hematemesis, hematochezia  Review of Systems  Nurses Notes reviewed and agree, including vitals, allergies, social history and prior medical history.      All systems reviewed and not pertinent unless noted.    Past Medical History:   Diagnosis Date    Arthritis     Hyperlipidemia        No Known Allergies    Past Surgical History:   Procedure Laterality Date    BREAST LUMPECTOMY      BUNIONECTOMY Left 01/2023    COLONOSCOPY  2013    HYSTERECTOMY  1986    LYMPH NODE BIOPSY      TONSILLECTOMY         Family History   Problem Relation Age of Onset    Stroke Mother     COPD Mother     Vision loss Mother     Cancer Father     COPD Father     Liver disease Brother        Social History     Socioeconomic History    Marital status:    Tobacco Use    Smoking status: Never     Passive exposure: Never    Smokeless tobacco: Never   Vaping Use    Vaping status: Never Used   Substance and Sexual Activity    Alcohol use: Yes     Alcohol/week: 2.0 standard drinks of alcohol     Types: 2 Glasses of wine per week     Comment: 2-3    Drug use: Never    Sexual activity: Not Currently     Partners: Male     Birth control/protection: Hysterectomy           Objective   Physical Exam  Physical Exam  GENERAL:Well developed.  Appears in no acute distress. Alert and awake.  HENT: Nares patent  EYES: No scleral icterus  CV: Regular rhythm, regular rate.  No peripheral edema noted  RESPIRATORY: Increased respiratory effort.  Diffuse rales and rhonchi noted.  Mild decreased breath sounds noted bilaterally  diffusely.  ABDOMEN: Soft, nontender. No rebound tenderness or guarding noted.   MUSCULOSKELETAL: No deformities.   NEURO: Alert, moves all extremities, follows commands  SKIN: Warm, dry, no rash visualized    Procedures           ED Course  ED Course as of 05/17/24 2354   Fri May 17, 2024   2217 EKG interpreted by me-sinus rhythm, rate 96 intervals within normal limits, no ST or T wave changes concerning for ischemia.  Normal EKG [HR]   2228 Per triage nurse patient low 90s when patient lying in the bed so O2 nasal cannula placed [OM]   2244 HS Troponin T(!): 18 [OM]   2245 Sodium(!): 126 [OM]   2246 Chloride(!): 91 [OM]   2329 Procalcitonin(!): 1.23 [OM]   2330 C-Reactive Protein(!): 32.10 [OM]      ED Course User Index  [HR] Perry Lu MD  [OM] Ivet Carter PA-C                                       73-year-old female presents with complaining of fatigue, cough, fever, shortness of breath, diarrhea.  Patient arrives febrile and tachycardic.  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  Patient does have rales and rhonchi bilaterally.  Patient saw primary care earlier today and sent blood work and COVID and flu and mono ran.  Mono, COVID, flu negative.  Patient denies any history of CHF or fluid overload, sepsis bolus given to patient.    Medical Decision Making  Amount and/or Complexity of Data Reviewed  Labs: ordered. Decision-making details documented in ED Course.  Radiology: ordered.  ECG/medicine tests: ordered.    Risk  OTC drugs.  Prescription drug management.    SEPTIC SHOCK FOCUSED EXAM ATTESTATION    I attest that I have reassessed tissue perfusion after the fluid bolus given.    Perry Lu MD  05/17/24  23:57 EDT     Final diagnoses:   Pneumonia of left lower lobe due to infectious organism   Acute hypoxic respiratory failure       ED Disposition  ED Disposition       ED Disposition   Send to Specialty Department    Condition   --     Comment   Level of Care: Telemetry [5]   Diagnosis: Left lower lobe pneumonia [480447]   Patient Class: Outpatient [102]                 No follow-up provider specified.       Medication List      No changes were made to your prescriptions during this visit.

## 2024-05-19 ENCOUNTER — HOSPITAL ENCOUNTER (INPATIENT)
Facility: HOSPITAL | Age: 74
LOS: 4 days | Discharge: HOME | DRG: 871 | End: 2024-05-23
Attending: EMERGENCY MEDICINE | Admitting: HOSPITALIST
Payer: MEDICARE

## 2024-05-19 ENCOUNTER — APPOINTMENT (OUTPATIENT)
Facility: HOSPITAL | Age: 74
DRG: 871 | End: 2024-05-19
Payer: MEDICARE

## 2024-05-19 DIAGNOSIS — J96.01 ACUTE HYPOXIC RESPIRATORY FAILURE: ICD-10-CM

## 2024-05-19 DIAGNOSIS — J18.9 SEPSIS DUE TO PNEUMONIA: Primary | ICD-10-CM

## 2024-05-19 DIAGNOSIS — A41.9 SEPSIS DUE TO PNEUMONIA: Primary | ICD-10-CM

## 2024-05-19 PROBLEM — R09.02 HYPOXIA: Status: ACTIVE | Noted: 2024-05-19

## 2024-05-19 LAB
ALBUMIN SERPL-MCNC: 3.3 G/DL (ref 3.5–5.2)
ALBUMIN/GLOB SERPL: 1.1 G/DL
ALP SERPL-CCNC: 77 U/L (ref 39–117)
ALT SERPL W P-5'-P-CCNC: 53 U/L (ref 1–33)
AMORPH URATE CRY URNS QL MICRO: ABNORMAL /HPF
ANION GAP SERPL CALCULATED.3IONS-SCNC: 15.7 MMOL/L (ref 5–15)
ARTERIAL PATENCY WRIST A: ABNORMAL
AST SERPL-CCNC: 77 U/L (ref 1–32)
ATMOSPHERIC PRESS: ABNORMAL MM[HG]
BACTERIA UR QL AUTO: ABNORMAL /HPF
BACTERIA UR QL AUTO: ABNORMAL /HPF
BASE EXCESS BLDA CALC-SCNC: 1.7 MMOL/L (ref 0–2)
BASOPHILS # BLD AUTO: 0.01 10*3/MM3 (ref 0–0.2)
BASOPHILS NFR BLD AUTO: 0.1 % (ref 0–1.5)
BDY SITE: ABNORMAL
BILIRUB SERPL-MCNC: 0.3 MG/DL (ref 0–1.2)
BILIRUB UR QL STRIP: NEGATIVE
BILIRUB UR QL STRIP: NEGATIVE
BODY TEMPERATURE: 37
BUN SERPL-MCNC: 13 MG/DL (ref 8–23)
BUN/CREAT SERPL: 18.6 (ref 7–25)
CALCIUM SPEC-SCNC: 8.4 MG/DL (ref 8.6–10.5)
CHLORIDE SERPL-SCNC: 94 MMOL/L (ref 98–107)
CLARITY UR: ABNORMAL
CLARITY UR: CLEAR
CO2 BLDA-SCNC: 24.5 MMOL/L (ref 22–33)
CO2 SERPL-SCNC: 20.3 MMOL/L (ref 22–29)
COHGB MFR BLD: 0.7 % (ref 0–2)
COLOR UR: YELLOW
COLOR UR: YELLOW
CREAT SERPL-MCNC: 0.7 MG/DL (ref 0.57–1)
CRP SERPL-MCNC: 22.5 MG/DL (ref 0–0.5)
D-LACTATE SERPL-SCNC: 1.2 MMOL/L (ref 0.5–2)
D-LACTATE SERPL-SCNC: 3.1 MMOL/L (ref 0.5–2)
DEPRECATED RDW RBC AUTO: 41.8 FL (ref 37–54)
EGFRCR SERPLBLD CKD-EPI 2021: 92.6 ML/MIN/1.73
EOSINOPHIL # BLD AUTO: 0 10*3/MM3 (ref 0–0.4)
EOSINOPHIL NFR BLD AUTO: 0 % (ref 0.3–6.2)
EPAP: 0
ERYTHROCYTE [DISTWIDTH] IN BLOOD BY AUTOMATED COUNT: 13.1 % (ref 12.3–15.4)
ERYTHROCYTE [SEDIMENTATION RATE] IN BLOOD: 64 MM/HR (ref 0–30)
GEN 5 2HR TROPONIN T REFLEX: 71 NG/L
GLOBULIN UR ELPH-MCNC: 2.9 GM/DL
GLUCOSE SERPL-MCNC: 126 MG/DL (ref 65–99)
GLUCOSE UR STRIP-MCNC: NEGATIVE MG/DL
GLUCOSE UR STRIP-MCNC: NEGATIVE MG/DL
GRAN CASTS URNS QL MICRO: ABNORMAL /LPF
HCO3 BLDA-SCNC: 23.6 MMOL/L (ref 20–26)
HCT VFR BLD AUTO: 34.1 % (ref 34–46.6)
HCT VFR BLD CALC: 36.6 %
HGB BLD-MCNC: 12 G/DL (ref 12–15.9)
HGB BLDA-MCNC: 11.9 G/DL (ref 14–18)
HGB UR QL STRIP.AUTO: ABNORMAL
HGB UR QL STRIP.AUTO: ABNORMAL
HOLD SPECIMEN: NORMAL
HYALINE CASTS UR QL AUTO: ABNORMAL /LPF
HYALINE CASTS UR QL AUTO: ABNORMAL /LPF
IMM GRANULOCYTES # BLD AUTO: 0.14 10*3/MM3 (ref 0–0.05)
IMM GRANULOCYTES NFR BLD AUTO: 1.4 % (ref 0–0.5)
INHALED O2 CONCENTRATION: 28 %
IPAP: 0
KETONES UR QL STRIP: NEGATIVE
KETONES UR QL STRIP: NEGATIVE
LEUKOCYTE ESTERASE UR QL STRIP.AUTO: NEGATIVE
LEUKOCYTE ESTERASE UR QL STRIP.AUTO: NEGATIVE
LYMPHOCYTES # BLD AUTO: 0.48 10*3/MM3 (ref 0.7–3.1)
LYMPHOCYTES NFR BLD AUTO: 4.7 % (ref 19.6–45.3)
MAGNESIUM SERPL-MCNC: 2 MG/DL (ref 1.6–2.4)
MCH RBC QN AUTO: 30.3 PG (ref 26.6–33)
MCHC RBC AUTO-ENTMCNC: 35.2 G/DL (ref 31.5–35.7)
MCV RBC AUTO: 86.1 FL (ref 79–97)
METHGB BLD QL: 0.7 % (ref 0–1.5)
MODALITY: ABNORMAL
MONOCYTES # BLD AUTO: 0.82 10*3/MM3 (ref 0.1–0.9)
MONOCYTES NFR BLD AUTO: 8 % (ref 5–12)
NEUTROPHILS NFR BLD AUTO: 8.82 10*3/MM3 (ref 1.7–7)
NEUTROPHILS NFR BLD AUTO: 85.8 % (ref 42.7–76)
NITRITE UR QL STRIP: NEGATIVE
NITRITE UR QL STRIP: NEGATIVE
NT-PROBNP SERPL-MCNC: 2771 PG/ML (ref 0–900)
OXYHGB MFR BLDV: 91 % (ref 94–99)
PAW @ PEAK INSP FLOW SETTING VENT: 0 CMH2O
PCO2 BLDA: 28.2 MM HG (ref 35–45)
PCO2 TEMP ADJ BLD: 28.2 MM HG (ref 35–45)
PH BLDA: 7.53 PH UNITS (ref 7.35–7.45)
PH UR STRIP.AUTO: 6 [PH] (ref 5–8)
PH UR STRIP.AUTO: 6 [PH] (ref 5–8)
PH, TEMP CORRECTED: 7.53 PH UNITS
PLATELET # BLD AUTO: 153 10*3/MM3 (ref 140–450)
PMV BLD AUTO: 11.8 FL (ref 6–12)
PO2 BLDA: 52.9 MM HG (ref 83–108)
PO2 TEMP ADJ BLD: 52.9 MM HG (ref 83–108)
POTASSIUM SERPL-SCNC: 3.3 MMOL/L (ref 3.5–5.2)
PROCALCITONIN SERPL-MCNC: 0.84 NG/ML (ref 0–0.25)
PROT SERPL-MCNC: 6.2 G/DL (ref 6–8.5)
PROT UR QL STRIP: ABNORMAL
PROT UR QL STRIP: ABNORMAL
RBC # BLD AUTO: 3.96 10*6/MM3 (ref 3.77–5.28)
RBC # UR STRIP: ABNORMAL /HPF
RBC # UR STRIP: ABNORMAL /HPF
REF LAB TEST METHOD: ABNORMAL
REF LAB TEST METHOD: ABNORMAL
SODIUM SERPL-SCNC: 130 MMOL/L (ref 136–145)
SP GR UR STRIP: 1.01 (ref 1–1.03)
SP GR UR STRIP: >=1.03 (ref 1–1.03)
SQUAMOUS #/AREA URNS HPF: ABNORMAL /HPF
SQUAMOUS #/AREA URNS HPF: ABNORMAL /HPF
TOTAL RATE: 26 BREATHS/MINUTE
TROPONIN T DELTA: -5 NG/L
TROPONIN T SERPL HS-MCNC: 76 NG/L
UROBILINOGEN UR QL STRIP: ABNORMAL
UROBILINOGEN UR QL STRIP: ABNORMAL
WBC # UR STRIP: ABNORMAL /HPF
WBC # UR STRIP: ABNORMAL /HPF
WBC NRBC COR # BLD AUTO: 10.27 10*3/MM3 (ref 3.4–10.8)
WHOLE BLOOD HOLD COAG: NORMAL
WHOLE BLOOD HOLD SPECIMEN: NORMAL
YEAST URNS QL MICRO: ABNORMAL /HPF

## 2024-05-19 PROCEDURE — 85652 RBC SED RATE AUTOMATED: CPT | Performed by: PHYSICIAN ASSISTANT

## 2024-05-19 PROCEDURE — 93005 ELECTROCARDIOGRAM TRACING: CPT | Performed by: PHYSICIAN ASSISTANT

## 2024-05-19 PROCEDURE — 99222 1ST HOSP IP/OBS MODERATE 55: CPT | Performed by: INTERNAL MEDICINE

## 2024-05-19 PROCEDURE — 81001 URINALYSIS AUTO W/SCOPE: CPT | Performed by: PHYSICIAN ASSISTANT

## 2024-05-19 PROCEDURE — 81001 URINALYSIS AUTO W/SCOPE: CPT | Performed by: INTERNAL MEDICINE

## 2024-05-19 PROCEDURE — 87449 NOS EACH ORGANISM AG IA: CPT | Performed by: PHYSICIAN ASSISTANT

## 2024-05-19 PROCEDURE — 86140 C-REACTIVE PROTEIN: CPT | Performed by: PHYSICIAN ASSISTANT

## 2024-05-19 PROCEDURE — 84484 ASSAY OF TROPONIN QUANT: CPT | Performed by: PHYSICIAN ASSISTANT

## 2024-05-19 PROCEDURE — 94799 UNLISTED PULMONARY SVC/PX: CPT

## 2024-05-19 PROCEDURE — 25010000002 CEFTRIAXONE PER 250 MG: Performed by: PHYSICIAN ASSISTANT

## 2024-05-19 PROCEDURE — 94640 AIRWAY INHALATION TREATMENT: CPT

## 2024-05-19 PROCEDURE — 83735 ASSAY OF MAGNESIUM: CPT | Performed by: PHYSICIAN ASSISTANT

## 2024-05-19 PROCEDURE — 82375 ASSAY CARBOXYHB QUANT: CPT

## 2024-05-19 PROCEDURE — 80053 COMPREHEN METABOLIC PANEL: CPT | Performed by: EMERGENCY MEDICINE

## 2024-05-19 PROCEDURE — 25010000002 AZITHROMYCIN PER 500 MG: Performed by: PHYSICIAN ASSISTANT

## 2024-05-19 PROCEDURE — 83880 ASSAY OF NATRIURETIC PEPTIDE: CPT | Performed by: PHYSICIAN ASSISTANT

## 2024-05-19 PROCEDURE — 87040 BLOOD CULTURE FOR BACTERIA: CPT | Performed by: EMERGENCY MEDICINE

## 2024-05-19 PROCEDURE — 83050 HGB METHEMOGLOBIN QUAN: CPT

## 2024-05-19 PROCEDURE — 36415 COLL VENOUS BLD VENIPUNCTURE: CPT

## 2024-05-19 PROCEDURE — 87899 AGENT NOS ASSAY W/OPTIC: CPT | Performed by: PHYSICIAN ASSISTANT

## 2024-05-19 PROCEDURE — 84145 PROCALCITONIN (PCT): CPT | Performed by: PHYSICIAN ASSISTANT

## 2024-05-19 PROCEDURE — 25810000003 SODIUM CHLORIDE 0.9 % SOLUTION 250 ML FLEX CONT: Performed by: PHYSICIAN ASSISTANT

## 2024-05-19 PROCEDURE — 71046 X-RAY EXAM CHEST 2 VIEWS: CPT

## 2024-05-19 PROCEDURE — 99285 EMERGENCY DEPT VISIT HI MDM: CPT

## 2024-05-19 PROCEDURE — 25010000002 VANCOMYCIN HCL IN NACL 2-0.9 GM/500ML-% SOLUTION: Performed by: PHYSICIAN ASSISTANT

## 2024-05-19 PROCEDURE — 36600 WITHDRAWAL OF ARTERIAL BLOOD: CPT

## 2024-05-19 PROCEDURE — 82805 BLOOD GASES W/O2 SATURATION: CPT

## 2024-05-19 PROCEDURE — 85025 COMPLETE CBC W/AUTO DIFF WBC: CPT | Performed by: EMERGENCY MEDICINE

## 2024-05-19 PROCEDURE — 83605 ASSAY OF LACTIC ACID: CPT | Performed by: EMERGENCY MEDICINE

## 2024-05-19 RX ORDER — ACETAMINOPHEN 500 MG
1000 TABLET ORAL ONCE
Status: COMPLETED | OUTPATIENT
Start: 2024-05-19 | End: 2024-05-19

## 2024-05-19 RX ORDER — ACETAMINOPHEN 325 MG/1
650 TABLET ORAL EVERY 4 HOURS PRN
Status: DISCONTINUED | OUTPATIENT
Start: 2024-05-19 | End: 2024-05-23

## 2024-05-19 RX ORDER — SODIUM CHLORIDE 9 MG/ML
40 INJECTION, SOLUTION INTRAVENOUS AS NEEDED
Status: DISCONTINUED | OUTPATIENT
Start: 2024-05-19 | End: 2024-05-23 | Stop reason: HOSPADM

## 2024-05-19 RX ORDER — ENOXAPARIN SODIUM 100 MG/ML
40 INJECTION SUBCUTANEOUS DAILY
Status: DISCONTINUED | OUTPATIENT
Start: 2024-05-20 | End: 2024-05-23 | Stop reason: HOSPADM

## 2024-05-19 RX ORDER — POTASSIUM CHLORIDE 750 MG/1
40 CAPSULE, EXTENDED RELEASE ORAL ONCE
Status: COMPLETED | OUTPATIENT
Start: 2024-05-19 | End: 2024-05-19

## 2024-05-19 RX ORDER — ATORVASTATIN CALCIUM 10 MG/1
10 TABLET, FILM COATED ORAL NIGHTLY
Status: DISCONTINUED | OUTPATIENT
Start: 2024-05-19 | End: 2024-05-23 | Stop reason: HOSPADM

## 2024-05-19 RX ORDER — IPRATROPIUM BROMIDE AND ALBUTEROL SULFATE 2.5; .5 MG/3ML; MG/3ML
3 SOLUTION RESPIRATORY (INHALATION)
Status: DISPENSED | OUTPATIENT
Start: 2024-05-19 | End: 2024-05-20

## 2024-05-19 RX ORDER — SODIUM CHLORIDE 0.9 % (FLUSH) 0.9 %
10 SYRINGE (ML) INJECTION AS NEEDED
Status: DISCONTINUED | OUTPATIENT
Start: 2024-05-19 | End: 2024-05-20

## 2024-05-19 RX ORDER — KETOROLAC TROMETHAMINE 15 MG/ML
15 INJECTION, SOLUTION INTRAMUSCULAR; INTRAVENOUS EVERY 6 HOURS PRN
Status: ACTIVE | OUTPATIENT
Start: 2024-05-19 | End: 2024-05-21

## 2024-05-19 RX ORDER — IPRATROPIUM BROMIDE AND ALBUTEROL SULFATE 2.5; .5 MG/3ML; MG/3ML
3 SOLUTION RESPIRATORY (INHALATION)
Status: DISPENSED | OUTPATIENT
Start: 2024-05-20 | End: 2024-05-21

## 2024-05-19 RX ORDER — AMOXICILLIN 250 MG
2 CAPSULE ORAL 2 TIMES DAILY PRN
Status: DISCONTINUED | OUTPATIENT
Start: 2024-05-19 | End: 2024-05-23 | Stop reason: HOSPADM

## 2024-05-19 RX ORDER — VANCOMYCIN 2 GRAM/500 ML IN 0.9 % SODIUM CHLORIDE INTRAVENOUS
20 ONCE
Status: COMPLETED | OUTPATIENT
Start: 2024-05-19 | End: 2024-05-19

## 2024-05-19 RX ORDER — BISACODYL 5 MG/1
5 TABLET, DELAYED RELEASE ORAL DAILY PRN
Status: DISCONTINUED | OUTPATIENT
Start: 2024-05-19 | End: 2024-05-23 | Stop reason: HOSPADM

## 2024-05-19 RX ORDER — BISACODYL 10 MG
10 SUPPOSITORY, RECTAL RECTAL DAILY PRN
Status: DISCONTINUED | OUTPATIENT
Start: 2024-05-19 | End: 2024-05-23 | Stop reason: HOSPADM

## 2024-05-19 RX ORDER — SODIUM CHLORIDE 0.9 % (FLUSH) 0.9 %
10 SYRINGE (ML) INJECTION EVERY 12 HOURS SCHEDULED
Status: DISCONTINUED | OUTPATIENT
Start: 2024-05-19 | End: 2024-05-23 | Stop reason: HOSPADM

## 2024-05-19 RX ORDER — IPRATROPIUM BROMIDE AND ALBUTEROL SULFATE 2.5; .5 MG/3ML; MG/3ML
3 SOLUTION RESPIRATORY (INHALATION) ONCE
Status: COMPLETED | OUTPATIENT
Start: 2024-05-19 | End: 2024-05-19

## 2024-05-19 RX ORDER — ASPIRIN 325 MG
325 TABLET ORAL ONCE
Status: COMPLETED | OUTPATIENT
Start: 2024-05-19 | End: 2024-05-19

## 2024-05-19 RX ORDER — POLYETHYLENE GLYCOL 3350 17 G/17G
17 POWDER, FOR SOLUTION ORAL DAILY PRN
Status: DISCONTINUED | OUTPATIENT
Start: 2024-05-19 | End: 2024-05-23 | Stop reason: HOSPADM

## 2024-05-19 RX ORDER — SODIUM CHLORIDE 0.9 % (FLUSH) 0.9 %
10 SYRINGE (ML) INJECTION AS NEEDED
Status: DISCONTINUED | OUTPATIENT
Start: 2024-05-19 | End: 2024-05-23 | Stop reason: HOSPADM

## 2024-05-19 RX ORDER — FLUCONAZOLE 100 MG/1
100 TABLET ORAL EVERY 24 HOURS
Qty: 5 TABLET | Refills: 0 | Status: DISCONTINUED | OUTPATIENT
Start: 2024-05-19 | End: 2024-05-21

## 2024-05-19 RX ORDER — HYDROCODONE POLISTIREX AND CHLORPHENIRAMINE POLISTIREX 10; 8 MG/5ML; MG/5ML
2.5 SUSPENSION, EXTENDED RELEASE ORAL EVERY 12 HOURS PRN
Status: DISCONTINUED | OUTPATIENT
Start: 2024-05-19 | End: 2024-05-23 | Stop reason: HOSPADM

## 2024-05-19 RX ADMIN — AZITHROMYCIN MONOHYDRATE 500 MG: 500 INJECTION, POWDER, LYOPHILIZED, FOR SOLUTION INTRAVENOUS at 15:26

## 2024-05-19 RX ADMIN — FLUCONAZOLE 100 MG: 100 TABLET ORAL at 22:27

## 2024-05-19 RX ADMIN — ASPIRIN 325 MG: 325 TABLET ORAL at 15:40

## 2024-05-19 RX ADMIN — ACETAMINOPHEN 1000 MG: 500 TABLET ORAL at 15:40

## 2024-05-19 RX ADMIN — IPRATROPIUM BROMIDE AND ALBUTEROL SULFATE 3 ML: 2.5; .5 SOLUTION RESPIRATORY (INHALATION) at 14:17

## 2024-05-19 RX ADMIN — Medication 10 ML: at 22:27

## 2024-05-19 RX ADMIN — IPRATROPIUM BROMIDE AND ALBUTEROL SULFATE 3 ML: 2.5; .5 SOLUTION RESPIRATORY (INHALATION) at 23:30

## 2024-05-19 RX ADMIN — CEFTRIAXONE SODIUM 2000 MG: 2 INJECTION, POWDER, FOR SOLUTION INTRAMUSCULAR; INTRAVENOUS at 14:43

## 2024-05-19 RX ADMIN — POTASSIUM CHLORIDE 40 MEQ: 750 CAPSULE, EXTENDED RELEASE ORAL at 15:40

## 2024-05-19 RX ADMIN — ATORVASTATIN CALCIUM 10 MG: 10 TABLET, FILM COATED ORAL at 22:26

## 2024-05-19 RX ADMIN — SODIUM CHLORIDE, POTASSIUM CHLORIDE, SODIUM LACTATE AND CALCIUM CHLORIDE 2250 ML: 600; 310; 30; 20 INJECTION, SOLUTION INTRAVENOUS at 14:52

## 2024-05-19 RX ADMIN — HYDROCODONE POLISTIREX AND CHLORPHENIRAMINE POLISTIREX 2.5 ML: 10; 8 SUSPENSION, EXTENDED RELEASE ORAL at 22:27

## 2024-05-19 RX ADMIN — Medication 2000 MG: at 18:18

## 2024-05-19 RX ADMIN — ACETAMINOPHEN 650 MG: 325 TABLET ORAL at 23:19

## 2024-05-19 NOTE — ED NOTES
Love Mackenzie    Nursing Report ED to Floor:  Mental status: a + o x4  Ambulatory status: up with one assist  Oxygen Therapy:  2L NC  Cardiac Rhythm: NSR  Admitted from: home/ED  Safety Concerns:  None  Social Issues: None  ED Room #:  17    ED Nurse Phone Extension - [phone] or may call [phone].      HPI:   Chief Complaint   Patient presents with    Fever       Past Medical History:  Past Medical History:   Diagnosis Date    Arthritis     Hyperlipidemia         Past Surgical History:  Past Surgical History:   Procedure Laterality Date    BREAST LUMPECTOMY      BUNIONECTOMY Left 01/2023    COLONOSCOPY  2013    HYSTERECTOMY  1986    LYMPH NODE BIOPSY      TONSILLECTOMY          Admitting Doctor:   No admitting provider for patient encounter.    Consulting Provider(s):  Consults       No orders found from 4/20/2024 to 5/20/2024.             Admitting Diagnosis:   The primary encounter diagnosis was Sepsis due to pneumonia. A diagnosis of Acute hypoxic respiratory failure was also pertinent to this visit.    Most Recent Vitals:   Vitals:    05/19/24 1537 05/19/24 1628 05/19/24 1649 05/19/24 1732   BP: 164/92 123/81  115/71   Pulse: 103 97 84 84   Resp:       Temp:       TempSrc:       SpO2: 92% 91% 92% 90%   Weight:       Height:           Active LDAs/IV Access:   Lines, Drains & Airways       Active LDAs       Name Placement date Placement time Site Days    Peripheral IV 05/19/24 1435 Anterior;Right Forearm 05/19/24  1435  Forearm  less than 1    Peripheral IV 05/19/24 1528 Anterior;Left Forearm 05/19/24  1528  Forearm  less than 1                    Labs (abnormal labs have a star):   Labs Reviewed   COMPREHENSIVE METABOLIC PANEL - Abnormal; Notable for the following components:       Result Value    Glucose 126 (*)     Sodium 130 (*)     Potassium 3.3 (*)     Chloride 94 (*)     CO2 20.3 (*)     Calcium 8.4 (*)     Albumin 3.3 (*)     ALT (SGPT) 53 (*)     AST (SGOT) 77 (*)     Anion Gap 15.7 (*)     All other  components within normal limits    Narrative:     GFR Normal >60  Chronic Kidney Disease <60  Kidney Failure <15    The GFR formula is only valid for adults with stable renal function between ages 18 and 70.   LACTIC ACID, PLASMA - Abnormal; Notable for the following components:    Lactate 3.1 (*)     All other components within normal limits   CBC WITH AUTO DIFFERENTIAL - Abnormal; Notable for the following components:    Neutrophil % 85.8 (*)     Lymphocyte % 4.7 (*)     Eosinophil % 0.0 (*)     Immature Grans % 1.4 (*)     Neutrophils, Absolute 8.82 (*)     Lymphocytes, Absolute 0.48 (*)     Immature Grans, Absolute 0.14 (*)     All other components within normal limits   C-REACTIVE PROTEIN - Abnormal; Notable for the following components:    C-Reactive Protein 22.50 (*)     All other components within normal limits   SEDIMENTATION RATE - Abnormal; Notable for the following components:    Sed Rate 64 (*)     All other components within normal limits   PROCALCITONIN - Abnormal; Notable for the following components:    Procalcitonin 0.84 (*)     All other components within normal limits   TROPONIN - Abnormal; Notable for the following components:    HS Troponin T 76 (*)     All other components within normal limits   BNP (IN-HOUSE) - Abnormal; Notable for the following components:    proBNP 2,771.0 (*)     All other components within normal limits    Narrative:     This assay is used as an aid in the diagnosis of individuals suspected of having heart failure. It can be used as an aid in the diagnosis of acute decompensated heart failure (ADHF) in patients presenting with signs and symptoms of ADHF to the emergency department (ED). In addition, NT-proBNP of <300 pg/mL indicates ADHF is not likely.    Age Range Result Interpretation  NT-proBNP Concentration (pg/mL:      <50             Positive            >450                   Gray                 300-450                    Negative             <300    50-75            Positive            >900                  Gray                300-900                  Negative            <300      >75             Positive            >1800                  Gray                300-1800                  Negative            <300   URINALYSIS W/ CULTURE IF INDICATED - Abnormal; Notable for the following components:    Blood, UA Large (3+) (*)     Protein,  mg/dL (2+) (*)     All other components within normal limits    Narrative:     In absence of clinical symptoms, the presence of pyuria, bacteria, and/or nitrites on the urinalysis result does not correlate with infection.   HIGH SENSITIVITIY TROPONIN T 2HR - Abnormal; Notable for the following components:    HS Troponin T 71 (*)     Troponin T Delta -5 (*)     All other components within normal limits   BLOOD GAS, ARTERIAL W/CO-OXIMETRY - Abnormal; Notable for the following components:    pH, Arterial 7.532 (*)     pCO2, Arterial 28.2 (*)     pO2, Arterial 52.9 (*)     Hemoglobin, Blood Gas 11.9 (*)     Oxyhemoglobin 91.0 (*)     pCO2, Temperature Corrected 28.2 (*)     pO2, Temperature Corrected 52.9 (*)     All other components within normal limits   URINALYSIS, MICROSCOPIC ONLY - Abnormal; Notable for the following components:    RBC, UA 3-5 (*)     WBC, UA 3-5 (*)     Bacteria, UA 1+ (*)     All other components within normal limits   MAGNESIUM - Normal   BLOOD CULTURE   BLOOD CULTURE   STREP PNEUMO AG, URINE OR CSF   LEGIONELLA ANTIGEN, URINE   RAINBOW DRAW    Narrative:     The following orders were created for panel order [place] Draw.  Procedure                               Abnormality         Status                     ---------                               -----------         ------                     Green Top (Gel)[[id number]]                                  Final result               Lavender Top[[id number]]                                     Final result               Gold Top - [facility][[id number]]                                    Final result               Gray Top[[id number]]                                         Final result               Light Blue Top[[id number]]                                   Final result                 Please view results for these tests on the individual orders.   BLOOD GAS, ARTERIAL   LACTIC ACID, REFLEX   CBC AND DIFFERENTIAL    Narrative:     The following orders were created for panel order CBC & Differential.  Procedure                               Abnormality         Status                     ---------                               -----------         ------                     CBC Auto Differential[[id number]]        Abnormal            Final result                 Please view results for these tests on the individual orders.   GREEN TOP   LAVENDER TOP   GOLD TOP - SST   GRAY TOP   LIGHT BLUE TOP       Meds Given in ED:   Medications   sodium chloride 0.9 % flush 10 mL (has no administration in time range)   vancomycin IVPB 2000 mg in 0.9% Sodium Chloride 500 mL (has no administration in time range)   sepsis fluid LR bolus 2,250 mL (2,250 mL Intravenous New Bag 5/19/24 1452)   cefTRIAXone (ROCEPHIN) 2,000 mg in sodium chloride 0.9 % 100 mL MBP (0 mg Intravenous Stopped 5/19/24 1515)   azithromycin (ZITHROMAX) 500 mg in sodium chloride 0.9 % 250 mL IVPB-VTB (0 mg Intravenous Stopped 5/19/24 1639)   ipratropium-albuterol (DUO-NEB) nebulizer solution 3 mL (3 mL Nebulization Given 5/19/24 1417)   acetaminophen (TYLENOL) tablet 1,000 mg (1,000 mg Oral Given 5/19/24 1540)   potassium chloride (MICRO-K/KLOR-CON) CR capsule (40 mEq Oral Given 5/19/24 1540)   aspirin tablet 325 mg (325 mg Oral Given 5/19/24 1540)           Last NIH score:                                                          Dysphagia screening results:        [place] Coma Scale:  No data recorded     CIWA:        Restraint Type:            Isolation Status:  No active isolations

## 2024-05-19 NOTE — FSED PROVIDER NOTE
Subjective  History of Present Illness:    Patient is a 73-year-old female with a medical history of hyperlipidemia presents to the emergency department for fever, dyspnea, cough.  Patient was discharged yesterday from the CDU unit at [facility] where she received antibiotics and was observed after being diagnosed with left lower lobe pneumonia.  Patient was sent home with cefdinir.  Patient had worsening symptoms and dyspnea prompting her to return to the emergency department today.  Patient states symptoms have been ongoing for 5 days after traveling to [place].  Patient denies medical history of MI, DVT or PE.  Patient denies smoking history.  Patient denies history of COPD or asthma.      Nurses Notes reviewed and agree, including vitals, allergies, social history and prior medical history.     REVIEW OF SYSTEMS: All systems reviewed and not pertinent unless noted.  Review of Systems   Constitutional:  Positive for chills, fatigue and fever.   HENT:  Negative for ear pain, sore throat and trouble swallowing.    Eyes:  Negative for pain and visual disturbance.   Respiratory:  Positive for cough and shortness of breath. Negative for wheezing.    Cardiovascular:  Negative for chest pain, palpitations and leg swelling.   Gastrointestinal:  Negative for abdominal distention, abdominal pain, blood in stool, constipation, diarrhea, nausea and vomiting.   Endocrine: Negative for cold intolerance and heat intolerance.   Genitourinary:  Negative for dysuria, flank pain, frequency, urgency, vaginal bleeding and vaginal discharge.   Musculoskeletal:  Negative for back pain, gait problem, neck pain and neck stiffness.   Skin:  Negative for rash and wound.   Allergic/Immunologic: Negative for immunocompromised state.   Neurological:  Negative for dizziness, syncope, speech difficulty, weakness, light-headedness, numbness and headaches.   Hematological:  Does not bruise/bleed easily.   Psychiatric/Behavioral:  Negative for  "agitation, confusion and suicidal ideas. The patient is not nervous/anxious.    All other systems reviewed and are negative.      Past Medical History:   Diagnosis Date    Arthritis     Hyperlipidemia        Allergies:    Patient has no known allergies.      Past Surgical History:   Procedure Laterality Date    BREAST LUMPECTOMY      BUNIONECTOMY Left 01/2023    COLONOSCOPY  2013    HYSTERECTOMY  1986    LYMPH NODE BIOPSY      TONSILLECTOMY           Social History     Socioeconomic History    Marital status:    Tobacco Use    Smoking status: Never     Passive exposure: Never    Smokeless tobacco: Never   Vaping Use    Vaping status: Never Used   Substance and Sexual Activity    Alcohol use: Yes     Alcohol/week: 2.0 standard drinks of alcohol     Types: 2 Glasses of wine per week     Comment: 2-3    Drug use: Never    Sexual activity: Not Currently     Partners: Male     Birth control/protection: Hysterectomy         Family History   Problem Relation Age of Onset    Stroke Mother     COPD Mother     Vision loss Mother     Cancer Father     COPD Father     Liver disease Brother        Objective  Physical Exam:  /83   Pulse 89   Temp (!) 103 °F (39.4 °C) (Oral)   Resp 26   Ht 167.6 cm (66\")   Wt 98.5 kg (217 lb 1.6 oz)   SpO2 92%   BMI 35.04 kg/m²      Physical Exam  Vitals and nursing note reviewed.   Constitutional:       General: She is not in acute distress.     Appearance: Normal appearance. She is ill-appearing.   HENT:      Head: Normocephalic and atraumatic.      Nose: Nose normal.      Mouth/Throat:      Mouth: Mucous membranes are moist.   Eyes:      Extraocular Movements: Extraocular movements intact.      Conjunctiva/sclera: Conjunctivae normal.      Pupils: Pupils are equal, round, and reactive to light.   Cardiovascular:      Rate and Rhythm: Regular rhythm. Tachycardia present.      Pulses: Normal pulses.      Heart sounds: Normal heart sounds. No murmur heard.  Pulmonary:      " Effort: No respiratory distress.      Breath sounds: No stridor. Rhonchi present.      Comments: Tachypnea.  Oxygen saturation is 87% ambulating on room air oxygen saturation 92% on 3 L nasal cannula.  Abdominal:      General: Abdomen is flat. There is no distension.      Palpations: Abdomen is soft. There is no mass.      Tenderness: There is no abdominal tenderness. There is no guarding.   Musculoskeletal:         General: No swelling or deformity. Normal range of motion.      Cervical back: Normal range of motion and neck supple.      Right lower leg: No edema.      Left lower leg: No edema.   Skin:     General: Skin is warm and dry.      Capillary Refill: Capillary refill takes less than 2 seconds.      Findings: No rash.   Neurological:      General: No focal deficit present.      Mental Status: She is alert and oriented to person, place, and time.      Cranial Nerves: No cranial nerve deficit.      Sensory: No sensory deficit.      Gait: Gait normal.   Psychiatric:         Mood and Affect: Mood normal.         Behavior: Behavior normal.         Procedures    ED Course:    ED Course as of 05/19/24 1521   Sun May 19, 2024   1437 HS Troponin T(!!): 76 [NM]   1437 High Sensitivity Troponin T(!!) [NM]   1437 High Sensitivity Troponin T(!!) [NM]      ED Course User Index  [NM] Vicente Mars, PA       Lab Results (last 24 hours)       Procedure Component Value Units Date/Time    CBC & Differential [[id number]]  (Abnormal) Collected: 05/19/24 1404    Specimen: Blood Updated: 05/19/24 1417    Narrative:      The following orders were created for panel order CBC & Differential.  Procedure                               Abnormality         Status                     ---------                               -----------         ------                     CBC Auto Differential[[id number]]        Abnormal            Final result                 Please view results for these tests on the individual orders.    Comprehensive  Metabolic Panel [[id number]]  (Abnormal) Collected: 05/19/24 1404    Specimen: Blood Updated: 05/19/24 1456     Glucose 126 mg/dL      BUN 13 mg/dL      Creatinine 0.70 mg/dL      Sodium 130 mmol/L      Potassium 3.3 mmol/L      Chloride 94 mmol/L      CO2 20.3 mmol/L      Calcium 8.4 mg/dL      Total Protein 6.2 g/dL      Albumin 3.3 g/dL      ALT (SGPT) 53 U/L      AST (SGOT) 77 U/L      Alkaline Phosphatase 77 U/L      Total Bilirubin 0.3 mg/dL      Globulin 2.9 gm/dL      A/G Ratio 1.1 g/dL      BUN/Creatinine Ratio 18.6     Anion Gap 15.7 mmol/L      eGFR 92.6 mL/min/1.73     Narrative:      GFR Normal >60  Chronic Kidney Disease <60  Kidney Failure <15    The GFR formula is only valid for adults with stable renal function between ages 18 and 70.    Lactic Acid, Plasma [[id number]]  (Abnormal) Collected: 05/19/24 1404    Specimen: Blood Updated: 05/19/24 1434     Lactate 3.1 mmol/L     CBC Auto Differential [[id number]]  (Abnormal) Collected: 05/19/24 1404    Specimen: Blood Updated: 05/19/24 1417     WBC 10.27 10*3/mm3      RBC 3.96 10*6/mm3      Hemoglobin 12.0 g/dL      Hematocrit 34.1 %      MCV 86.1 fL      MCH 30.3 pg      MCHC 35.2 g/dL      RDW 13.1 %      RDW-SD 41.8 fl      MPV 11.8 fL      Platelets 153 10*3/mm3      Neutrophil % 85.8 %      Lymphocyte % 4.7 %      Monocyte % 8.0 %      Eosinophil % 0.0 %      Basophil % 0.1 %      Immature Grans % 1.4 %      Neutrophils, Absolute 8.82 10*3/mm3      Lymphocytes, Absolute 0.48 10*3/mm3      Monocytes, Absolute 0.82 10*3/mm3      Eosinophils, Absolute 0.00 10*3/mm3      Basophils, Absolute 0.01 10*3/mm3      Immature Grans, Absolute 0.14 10*3/mm3     C-reactive Protein [[id number]]  (Abnormal) Collected: 05/19/24 1404    Specimen: Blood Updated: 05/19/24 1456     C-Reactive Protein 22.50 mg/dL     Sedimentation Rate [[id number]]  (Abnormal) Collected: 05/19/24 1404    Specimen: Blood Updated: 05/19/24 1420     Sed Rate 64 mm/hr     Procalcitonin [[id number]]   (Abnormal) Collected: 05/19/24 1404    Specimen: Blood Updated: 05/19/24 1440     Procalcitonin 0.84 ng/mL     High Sensitivity Troponin T [[id number]]  (Abnormal) Collected: 05/19/24 1404    Specimen: Blood Updated: 05/19/24 1434     HS Troponin T 76 ng/L     BNP [[id number]]  (Abnormal) Collected: 05/19/24 1404    Specimen: Blood Updated: 05/19/24 1433     proBNP 2,771.0 pg/mL     Narrative:      This assay is used as an aid in the diagnosis of individuals suspected of having heart failure. It can be used as an aid in the diagnosis of acute decompensated heart failure (ADHF) in patients presenting with signs and symptoms of ADHF to the emergency department (ED). In addition, NT-proBNP of <300 pg/mL indicates ADHF is not likely.    Age Range Result Interpretation  NT-proBNP Concentration (pg/mL:      <50             Positive            >450                   Gray                 300-450                    Negative             <300    50-75           Positive            >900                  Gray                300-900                  Negative            <300      >75             Positive            >1800                  Gray                300-1800                  Negative            <300    Magnesium [[id number]]  (Normal) Collected: 05/19/24 1404    Specimen: Blood Updated: 05/19/24 1456     Magnesium 2.0 mg/dL     Blood Gas, Arterial With Co-Ox [[id number]]  (Abnormal) Collected: 05/19/24 1439    Specimen: Arterial Blood Updated: 05/19/24 1440     Site Right Radial     Philippe's Test N/A     pH, Arterial 7.532 pH units      Comment: 83 Value above reference range        pCO2, Arterial 28.2 mm Hg      Comment: 84 Value below reference range        pO2, Arterial 52.9 mm Hg      Comment: 84 Value below reference range        HCO3, Arterial 23.6 mmol/L      Base Excess, Arterial 1.7 mmol/L      Hemoglobin, Blood Gas 11.9 g/dL      Comment: 84 Value below reference range        Hematocrit, Blood Gas 36.6 %       Oxyhemoglobin 91.0 %      Comment: 84 Value below reference range        Methemoglobin 0.70 %      Carboxyhemoglobin 0.7 %      CO2 Content 24.5 mmol/L      Temperature 37.0     Barometric Pressure for Blood Gas --     Comment: N/A        Modality Nasal Cannula     FIO2 28 %      Rate 26 Breaths/minute      PIP 0 cmH2O      Comment: Meter: [id number]     :  [id number]        IPAP 0     EPAP 0     pH, Temp Corrected 7.532 pH Units      pCO2, Temperature Corrected 28.2 mm Hg      pO2, Temperature Corrected 52.9 mm Hg              XR Chest 2 View    Result Date: 5/19/2024  XR CHEST 2 VW Date of Exam: 5/19/2024 2:01 PM EDT Indication: previous pneumonia Dx, dyspnea Comparison: Chest CT 5/17/2024. Findings: Cardiomediastinal silhouette is within normal limits. Right upper lobe calcified granuloma. Confluent airspace disease in the left lower lobe, overall similar compared to recent chest CT within the confines of modality differences. No evidence of focal consolidation otherwise. Probable trace left pleural effusion. No right pleural effusion. No pneumothorax. Osseous structures are unchanged.     Impression: Impression: Overall similar imaging appearance of patient's left lower lobe pneumonia. No evidence of new or worsening disease in the chest. Electronically Signed: Armando Garcia MD  5/19/2024 2:46 PM EDT  Workstation ID: [id number]    CT Abdomen Pelvis With Contrast    Result Date: 5/17/2024  CT ABDOMEN PELVIS W CONTRAST Date of Exam: 5/17/2024 10:53 PM EDT Indication: diarrhea. Comparison: None available. Technique: Axial CT images were obtained of the abdomen and pelvis following the uneventful intravenous administration of 90 mL Isovue-370. Reconstructed coronal and sagittal images were also obtained. Automated exposure control and iterative construction methods were used. Findings: Lung Bases:   There is left lower lobe airspace disease consistent with pneumonia. There is a trace pleural effusion on  the left. Liver: Liver is normal in size and CT density. No focal lesions. Biliary/Gallbladder:  There is density change within the gallbladder which may be small stones or sludge. Spleen: Spleen is normal in size and CT density. Pancreas:  Pancreas is normal. There is no evidence of pancreatic mass or peripancreatic fluid. Kidneys:  Kidneys are normal in size. There are no stones or hydronephrosis. Adrenals:  Adrenal glands are unremarkable. Retroperitoneal/Lymph Nodes/Vasculature:  No retroperitoneal adenopathy is identified. Gastrointestinal/Mesentery:  The bowel loops are non-dilated without wall thickening or mass. There is diverticulosis without diverticulitis. The appendix appears within normal limits. No evidence of obstruction. No free air. No mesenteric fluid collections identified. Bladder:  The bladder is normal. Genital:   Patient is status post hysterectomy.       Bony Structures:   Visualized bony structures are consistent with the patient's age.     Impression: Impression: Left lower lobe pneumonia. No acute abdominal or pelvic abnormality. Electronically Signed: Truong Perez MD  5/17/2024 11:37 PM EDT  Workstation ID: [id number]    CT Angiogram Chest Pulmonary Embolism    Result Date: 5/17/2024  CT ANGIOGRAM CHEST PULMONARY EMBOLISM Date of Exam: 5/17/2024 10:53 PM EDT Indication: hypoxic. Comparison: None available. Technique: Axial CT images were obtained of the chest after the uneventful intravenous administration of 90 mL Isovue-370 utilizing pulmonary embolism protocol.  Reconstructed coronal and sagittal images were also obtained. Automated exposure control and  iterative construction methods were used. Findings: Pulmonary arteries: Adequate opacification of the pulmonary arteries. No evidence of acute pulmonary embolism. Lungs and Pleura: There is diffuse consolidation with air bronchograms involving the left lower lobe. There is a small pleural effusion. There is no pulmonary mass. There are  no suspicious pulmonary nodules. Mediastinum/Missy: No mediastinal or hilar lymphadenopathy. Lymph nodes: No axillary or supraclavicular adenopathy. Cardiovascular: The cardiac chambers are within normal limits. The pericardium is normal. The aorta and its arch branch vessels are unremarkable.   Upper Abdomen: The upper abdominal contents are unremarkable.      Bones and Soft Tissue: No suspicious osseous lesion.     Impression: Impression: Diffuse left lower lobe infiltrate with adjacent small effusion. The findings are consistent with pneumonia. There is no pulmonary embolism. Electronically Signed: Truong Perez MD  5/17/2024 11:33 PM EDT  Workstation ID: [id number]        [facility]     Amount and/or Complexity of Data Reviewed  Clinical lab tests: reviewed  Tests in the radiology section of CPT®: reviewed  Tests in the medicine section of CPT®: reviewed  Decide to obtain previous medical records or to obtain history from someone other than the patient: yes        Initial impression of presenting illness: Patient is a 73-year-old female with a medical history of hyperlipidemia who was discharged from the CDU unit at [facility] yesterday after being observed and treated for left lower lobe pneumonia.  Patient was treated with IV Rocephin and azithromycin and sent home on cefdinir.    DDX: includes but is not limited to: Pneumonia, sepsis, PE, ACS, arrhythmia, CHF exacerbation, others    Patient arrives febrile temperature 103, tachycardic, tachypneic with vitals interpreted by myself.  Patient meets criteria for sepsis and a 30 cc per cake IV fluids as well as Rocephin and azithromycin is initiated    Pertinent features from physical exam: Tachypnea, tachycardia, O2 saturation 87% after ambulating to ER room.    Initial diagnostic plan: Will initiate workup for concern for sepsis secondary to pneumonia.  Workup includes CBC, CMP, lactic acid, procalcitonin, troponin, EKG, chest x-ray, magnesium, BNP,  ESR, CRP, urinalysis.  Interventions include IV Rocephin, azithromycin, 30 cc per cake IV fluids, DuoNeb, Tylenol.      Interventions / Re-evaluation: Lab work independently reviewed.  There is no evidence of leukocytosis, significant anemia.  Lactic acid is significantly elevated at 3.1 and procalcitonin is elevated at 0.84.  ESR and sed rate both elevated.  Concern for sepsis from pneumonia.  Chest x-ray revealed Overall similar imaging appearance of patient's left lower lobe pneumonia. No evidence of new or worsening disease in the chest.  Troponin 0 hours elevated at 76.  There is no evidence of STEMI on EKG. Will give  mg. Repeat troponin down trends to 71.  Discussed case with internal medicine, Dr. Perea who agrees to accept Pt for transfer to [facility].  She request patient be started on vancomycin as well as have urine antigen for strep and Legionella ordered, all of which is done.  Patient is agreeable plan to be transferred.    Medications   sodium chloride 0.9 % flush 10 mL (has no administration in time range)   sepsis fluid LR bolus 2,250 mL (2,250 mL Intravenous New Bag 5/19/24 1452)   azithromycin (ZITHROMAX) 500 mg in sodium chloride 0.9 % 250 mL IVPB-VTB (has no administration in time range)   acetaminophen (TYLENOL) tablet 1,000 mg (has no administration in time range)   potassium chloride (MICRO-K/KLOR-CON) CR capsule (has no administration in time range)   aspirin tablet 325 mg (has no administration in time range)   cefTRIAXone (ROCEPHIN) 2,000 mg in sodium chloride 0.9 % 100 mL MBP (2,000 mg Intravenous New Bag 5/19/24 1443)   ipratropium-albuterol (DUO-NEB) nebulizer solution 3 mL (3 mL Nebulization Given 5/19/24 1417)       Data interpreted: Nursing notes reviewed, vital signs reviewed.  Labs independently interpreted by me (CBC, CMP, lipase, UA, troponin, ABG, lactic acid, procalcitonin).  Imaging independently interpreted by me (x-ray, CT scan).  EKG  independently interpreted by me.  O2 saturation:    Counseling: Discussed the results above with the patient regarding need for admission or discharge.  Patient understands and agrees plan of care.      -----  ED Disposition       None          Final diagnoses:   None     Your Follow-Up Providers    Follow-up information has not been specified.       Contact information for after-discharge care    Follow-up information has not been specified.          Your medication list        ASK your doctor about these medications        Instructions Last Dose Given Next Dose Due   atorvastatin 10 MG tablet  Commonly known as: LIPITOR      TAKE 1 TABLET BY MOUTH EVERY DAY       cefdinir 300 MG capsule  Commonly known as: OMNICEF      Take 1 capsule by mouth 2 (Two) Times a Day.

## 2024-05-20 ENCOUNTER — APPOINTMENT (OUTPATIENT)
Dept: CT IMAGING | Facility: HOSPITAL | Age: 74
DRG: 871 | End: 2024-05-20
Payer: MEDICARE

## 2024-05-20 ENCOUNTER — APPOINTMENT (OUTPATIENT)
Dept: GENERAL RADIOLOGY | Facility: HOSPITAL | Age: 74
DRG: 871 | End: 2024-05-20
Payer: MEDICARE

## 2024-05-20 LAB
ALBUMIN SERPL-MCNC: 2.9 G/DL (ref 3.5–5.2)
ALBUMIN/GLOB SERPL: 1.5 G/DL
ALP SERPL-CCNC: 66 U/L (ref 39–117)
ALT SERPL W P-5'-P-CCNC: 51 U/L (ref 1–33)
ANION GAP SERPL CALCULATED.3IONS-SCNC: 11 MMOL/L (ref 5–15)
AST SERPL-CCNC: 76 U/L (ref 1–32)
B PARAPERT DNA SPEC QL NAA+PROBE: NOT DETECTED
B PERT DNA SPEC QL NAA+PROBE: NOT DETECTED
BASOPHILS # BLD AUTO: 0.03 10*3/MM3 (ref 0–0.2)
BASOPHILS NFR BLD AUTO: 0.3 % (ref 0–1.5)
BILIRUB SERPL-MCNC: 0.4 MG/DL (ref 0–1.2)
BUN SERPL-MCNC: 8 MG/DL (ref 8–23)
BUN/CREAT SERPL: 11.6 (ref 7–25)
C PNEUM DNA NPH QL NAA+NON-PROBE: NOT DETECTED
CALCIUM SPEC-SCNC: 7.6 MG/DL (ref 8.6–10.5)
CHLORIDE SERPL-SCNC: 96 MMOL/L (ref 98–107)
CO2 SERPL-SCNC: 25 MMOL/L (ref 22–29)
CREAT SERPL-MCNC: 0.69 MG/DL (ref 0.57–1)
DEPRECATED RDW RBC AUTO: 43.3 FL (ref 37–54)
EGFRCR SERPLBLD CKD-EPI 2021: 92.9 ML/MIN/1.73
EOSINOPHIL # BLD AUTO: 0 10*3/MM3 (ref 0–0.4)
EOSINOPHIL NFR BLD AUTO: 0 % (ref 0.3–6.2)
ERYTHROCYTE [DISTWIDTH] IN BLOOD BY AUTOMATED COUNT: 13.5 % (ref 12.3–15.4)
FLUAV SUBTYP SPEC NAA+PROBE: NOT DETECTED
FLUBV RNA ISLT QL NAA+PROBE: NOT DETECTED
GLOBULIN UR ELPH-MCNC: 2 GM/DL
GLUCOSE SERPL-MCNC: 124 MG/DL (ref 65–99)
HADV DNA SPEC NAA+PROBE: NOT DETECTED
HCOV 229E RNA SPEC QL NAA+PROBE: NOT DETECTED
HCOV HKU1 RNA SPEC QL NAA+PROBE: NOT DETECTED
HCOV NL63 RNA SPEC QL NAA+PROBE: NOT DETECTED
HCOV OC43 RNA SPEC QL NAA+PROBE: NOT DETECTED
HCT VFR BLD AUTO: 34.9 % (ref 34–46.6)
HGB BLD-MCNC: 11.7 G/DL (ref 12–15.9)
HMPV RNA NPH QL NAA+NON-PROBE: NOT DETECTED
HPIV1 RNA ISLT QL NAA+PROBE: NOT DETECTED
HPIV2 RNA SPEC QL NAA+PROBE: NOT DETECTED
HPIV3 RNA NPH QL NAA+PROBE: NOT DETECTED
HPIV4 P GENE NPH QL NAA+PROBE: NOT DETECTED
IMM GRANULOCYTES # BLD AUTO: 0.17 10*3/MM3 (ref 0–0.05)
IMM GRANULOCYTES NFR BLD AUTO: 1.7 % (ref 0–0.5)
L PNEUMO1 AG UR QL IA: POSITIVE
LYMPHOCYTES # BLD AUTO: 0.71 10*3/MM3 (ref 0.7–3.1)
LYMPHOCYTES NFR BLD AUTO: 6.9 % (ref 19.6–45.3)
M PNEUMO IGG SER IA-ACNC: NOT DETECTED
MCH RBC QN AUTO: 29.4 PG (ref 26.6–33)
MCHC RBC AUTO-ENTMCNC: 33.5 G/DL (ref 31.5–35.7)
MCV RBC AUTO: 87.7 FL (ref 79–97)
MONOCYTES # BLD AUTO: 0.4 10*3/MM3 (ref 0.1–0.9)
MONOCYTES NFR BLD AUTO: 3.9 % (ref 5–12)
MRSA DNA SPEC QL NAA+PROBE: NEGATIVE
NEUTROPHILS NFR BLD AUTO: 8.91 10*3/MM3 (ref 1.7–7)
NEUTROPHILS NFR BLD AUTO: 87.2 % (ref 42.7–76)
NRBC BLD AUTO-RTO: 0 /100 WBC (ref 0–0.2)
PLATELET # BLD AUTO: 133 10*3/MM3 (ref 140–450)
PMV BLD AUTO: 11.3 FL (ref 6–12)
POTASSIUM SERPL-SCNC: 3.7 MMOL/L (ref 3.5–5.2)
PROT SERPL-MCNC: 4.9 G/DL (ref 6–8.5)
QT INTERVAL: 334 MS
QTC INTERVAL: 428 MS
RBC # BLD AUTO: 3.98 10*6/MM3 (ref 3.77–5.28)
RHINOVIRUS RNA SPEC NAA+PROBE: NOT DETECTED
RSV RNA NPH QL NAA+NON-PROBE: NOT DETECTED
S PNEUM AG SPEC QL LA: NEGATIVE
SARS-COV-2 RNA NPH QL NAA+NON-PROBE: NOT DETECTED
SODIUM SERPL-SCNC: 132 MMOL/L (ref 136–145)
WBC NRBC COR # BLD AUTO: 10.22 10*3/MM3 (ref 3.4–10.8)

## 2024-05-20 PROCEDURE — 25010000002 VANCOMYCIN 1 G RECONSTITUTED SOLUTION 1 EACH VIAL

## 2024-05-20 PROCEDURE — 25810000003 SODIUM CHLORIDE 0.9 % SOLUTION 250 ML FLEX CONT: Performed by: HOSPITALIST

## 2024-05-20 PROCEDURE — 80053 COMPREHEN METABOLIC PANEL: CPT | Performed by: INTERNAL MEDICINE

## 2024-05-20 PROCEDURE — 25810000003 SODIUM CHLORIDE 0.9 % SOLUTION 250 ML FLEX CONT

## 2024-05-20 PROCEDURE — 85025 COMPLETE CBC W/AUTO DIFF WBC: CPT | Performed by: INTERNAL MEDICINE

## 2024-05-20 PROCEDURE — 94799 UNLISTED PULMONARY SVC/PX: CPT

## 2024-05-20 PROCEDURE — 25010000002 ENOXAPARIN PER 10 MG: Performed by: INTERNAL MEDICINE

## 2024-05-20 PROCEDURE — 25010000002 AZITHROMYCIN PER 500 MG: Performed by: HOSPITALIST

## 2024-05-20 PROCEDURE — 71250 CT THORAX DX C-: CPT

## 2024-05-20 PROCEDURE — 99233 SBSQ HOSP IP/OBS HIGH 50: CPT | Performed by: HOSPITALIST

## 2024-05-20 PROCEDURE — 0202U NFCT DS 22 TRGT SARS-COV-2: CPT | Performed by: HOSPITALIST

## 2024-05-20 PROCEDURE — 71045 X-RAY EXAM CHEST 1 VIEW: CPT

## 2024-05-20 PROCEDURE — 25010000002 PIPERACILLIN SOD-TAZOBACTAM PER 1 G: Performed by: HOSPITALIST

## 2024-05-20 PROCEDURE — 87641 MR-STAPH DNA AMP PROBE: CPT | Performed by: INTERNAL MEDICINE

## 2024-05-20 RX ORDER — VANCOMYCIN 2 GRAM/500 ML IN 0.9 % SODIUM CHLORIDE INTRAVENOUS
20 EVERY 24 HOURS
Status: DISCONTINUED | OUTPATIENT
Start: 2024-05-20 | End: 2024-05-20 | Stop reason: SDUPTHER

## 2024-05-20 RX ORDER — GUAIFENESIN 600 MG/1
600 TABLET, EXTENDED RELEASE ORAL EVERY 12 HOURS SCHEDULED
Status: DISCONTINUED | OUTPATIENT
Start: 2024-05-20 | End: 2024-05-23 | Stop reason: HOSPADM

## 2024-05-20 RX ADMIN — ATORVASTATIN CALCIUM 10 MG: 10 TABLET, FILM COATED ORAL at 22:55

## 2024-05-20 RX ADMIN — PIPERACILLIN AND TAZOBACTAM 3.38 G: 3; .375 INJECTION, POWDER, LYOPHILIZED, FOR SOLUTION INTRAVENOUS at 16:10

## 2024-05-20 RX ADMIN — IPRATROPIUM BROMIDE AND ALBUTEROL SULFATE 3 ML: 2.5; .5 SOLUTION RESPIRATORY (INHALATION) at 16:06

## 2024-05-20 RX ADMIN — VANCOMYCIN HYDROCHLORIDE 1000 MG: 1 INJECTION, POWDER, LYOPHILIZED, FOR SOLUTION INTRAVENOUS at 08:09

## 2024-05-20 RX ADMIN — PIPERACILLIN AND TAZOBACTAM 3.38 G: 3; .375 INJECTION, POWDER, LYOPHILIZED, FOR SOLUTION INTRAVENOUS at 11:09

## 2024-05-20 RX ADMIN — GUAIFENESIN 600 MG: 600 TABLET, EXTENDED RELEASE ORAL at 22:55

## 2024-05-20 RX ADMIN — Medication 10 ML: at 22:55

## 2024-05-20 RX ADMIN — IPRATROPIUM BROMIDE AND ALBUTEROL SULFATE 3 ML: 2.5; .5 SOLUTION RESPIRATORY (INHALATION) at 20:23

## 2024-05-20 RX ADMIN — VANCOMYCIN HYDROCHLORIDE 1000 MG: 1 INJECTION, POWDER, LYOPHILIZED, FOR SOLUTION INTRAVENOUS at 22:54

## 2024-05-20 RX ADMIN — IPRATROPIUM BROMIDE AND ALBUTEROL SULFATE 3 ML: 2.5; .5 SOLUTION RESPIRATORY (INHALATION) at 08:48

## 2024-05-20 RX ADMIN — Medication 10 ML: at 08:09

## 2024-05-20 RX ADMIN — ENOXAPARIN SODIUM 40 MG: 100 INJECTION SUBCUTANEOUS at 08:09

## 2024-05-20 RX ADMIN — ACETAMINOPHEN 650 MG: 325 TABLET ORAL at 05:39

## 2024-05-20 RX ADMIN — GUAIFENESIN 600 MG: 600 TABLET, EXTENDED RELEASE ORAL at 06:55

## 2024-05-20 RX ADMIN — FLUCONAZOLE 100 MG: 100 TABLET ORAL at 22:55

## 2024-05-20 RX ADMIN — SODIUM CHLORIDE 500 MG: 9 INJECTION, SOLUTION INTRAVENOUS at 14:07

## 2024-05-20 RX ADMIN — IPRATROPIUM BROMIDE AND ALBUTEROL SULFATE 3 ML: 2.5; .5 SOLUTION RESPIRATORY (INHALATION) at 03:22

## 2024-05-20 NOTE — PLAN OF CARE
Goal Outcome Evaluation:  Plan of Care Reviewed With: patient         [facility]ect admit. A/ox4. SR-ST on tele monitor. O2 increased to 5L NC during sleep to keep >90%. Temp of 101, given Tylenol PRN x2 with relief. Coughing frequently. Up to toilet with standby assist.

## 2024-05-20 NOTE — H&P
[facility] Medicine Services  HISTORY AND PHYSICAL    Patient Name: Love Mackenzie  : 1950  MRN: [id number]  Primary Care Physician: Silvia Garcia MD    Subjective   Subjective     Chief Complaint:cough    HPI:  Love Mackenzie is a 73 y.o. female who  has a past medical history of Arthritis and Hyperlipidemia. who presented to [place] ED on  with cough and fever, she stayed overnight and DCd home . She returned today  with increasing confusion, was found to have a temp pg 1-34 and to be hypoxic. She was sent to [facility] for admission.   Patient is a poor historian, but mostly complains of just feeling poorly and being tired of coughing. She denies hemoptysis, She does have side pain.    Review of Systems   Patient denies weight loss, headaches, changes in vision, fever, chills, sore throat,nausea or vomiting, diarrhea, abdominal pain or distension, change in urine output or habits, joint pain, rash, itching, numbness/tingling,  or bleeding.    Otherwise complete ROS is negative except as mentioned in the HPI.    Personal History       PMH: She  has a past medical history of Arthritis and Hyperlipidemia.   PSxH: She  has a past surgical history that includes Breast lumpectomy; Hysterectomy (); Lymph node biopsy; Tonsillectomy; Colonoscopy (); and Bunionectomy (Left, 2023).         FH: Her family history includes COPD in her father and mother; Cancer in her father; Liver disease in her brother; Stroke in her mother; Vision loss in her mother.   SH: She  reports that she has never smoked. She has never been exposed to tobacco smoke. She has never used smokeless tobacco. She reports current alcohol use of about 2.0 standard drinks of alcohol per week. She reports that she does not use drugs.     Medications:  atorvastatin and cefdinir    No Known Allergies    Objective   Objective     Vital Signs:   Temp:  [98.8 °F (37.1 °C)-103 °F (39.4 °C)] 101  °F (38.3 °C)  Heart Rate:  [] 111  Resp:  [20-27] 20  BP: (115-164)/(71-93) 154/93  Flow (L/min):  [2-5] 5    Constitutional: Awake, alert, interactive and flat affect  Eyes: clear sclerae, no conjunctival injection  HENT: NCAT, mucous membranes dry  Neck: no masses or lymphadenopathy, trachea midline  Respiratory: diminished breath sounds, splinting, occ wheezing  Cardiovascular: RRR, no murmurs appreciated, palpable peripheral pulses  Abdomen:  soft, no HSM or masses palpable, not tender or distended  Musculoskeletal: No peripheral edema, clubbing or cyanosis  Neurologic:                   Strength symmetric in all extremities                     Cranial Nerves grossly intact, speech clear  Skin: No rashes or jaundice  Psychiatric: simple insight      Result Review:  I have personally reviewed the results from the time of this admission   to 5/19/2024 20:22 EDT and agree with these findings:  [x]  Laboratory  [x]  Microbiology  [x]  Radiology  []  EKG/Telemetry   []  Cardiology/Vascular   []  Pathology  [x]  Old records  []  Other:,   Most notable findings include: left shift, high procal, crp, lactic acid  elevated troponin    LAB RESULTS:      Lab 05/19/24  1804 05/19/24  1404 05/18/24  0632 05/18/24  0117 05/17/24  2206 05/17/24  2205 05/17/24  1226   WBC  --  10.27 10.63 11.71*  --  12.86* 14.46*   HEMOGLOBIN  --  12.0 11.8* 11.0*  --  13.1 12.8   HEMATOCRIT  --  34.1 34.3 32.0*  --  37.8 37.8   PLATELETS  --  153 139* 136*  --  160 170   NEUTROS ABS  --  8.82* 9.87* 10.81*  --  11.21* 12.84*   IMMATURE GRANS (ABS)  --  0.14* 0.04 0.03  --  0.06* 0.07*   LYMPHS ABS  --  0.48* 0.37* 0.36*  --  0.70 0.50*   MONOS ABS  --  0.82 0.34 0.50  --  0.87 1.02*   EOS ABS  --  0.00 0.00 0.00  --  0.00 0.00   MCV  --  86.1 88.9 88.9  --  87.9 86.9   SED RATE  --  64*  --   --  56*  --   --    CRP  --  22.50*  --   --  32.10*  --   --    PROCALCITONIN  --  0.84* 1.34* 1.22* 1.23*  --   --    LACTATE 1.2 3.1*  --  0.8  1.8  --   --          Lab 05/19/24  1404 05/18/24  0632 05/18/24  0117 05/17/24 2205 05/17/24  1226   SODIUM 130* 134* 130* 126* 129*   POTASSIUM 3.3* 3.7 3.5 3.6 4.5   CHLORIDE 94* 103 97* 91* 95*   CO2 20.3* 18.5* 17.1* 17.8* 20.2*   ANION GAP 15.7* 12.5 15.9* 17.2* 13.8   BUN 13 17 18 22 22   CREATININE 0.70 0.58 0.63 0.85 0.92   EGFR 92.6 96.9 95.0 73.4 66.7   GLUCOSE 126* 153* 120* 120* 123*   CALCIUM 8.4* 8.1* 7.5* 8.8 8.7   MAGNESIUM 2.0  --   --  2.2  --    TSH  --   --   --   --  1.610         Lab 05/19/24  1404 05/18/24  0632 05/18/24 0117 05/17/24 2205 05/17/24  1226   TOTAL PROTEIN 6.2 6.2 5.8* 7.2 7.0   ALBUMIN 3.3* 3.3* 3.1* 3.7 3.8   GLOBULIN 2.9 2.9 2.7 3.5 3.2   ALT (SGPT) 53* 28 26 31 25   AST (SGOT) 77* 35* 33* 38* 24   BILIRUBIN 0.3 0.3 0.4 0.5 0.6   ALK PHOS 77 68 61 77 78         Lab 05/19/24  1621 05/19/24  1404 05/17/24 2206 05/17/24 2205   PROBNP  --  2,771.0* 351.0  --    HSTROP T 71* 76*  --  18*             Lab 05/17/24  1226   FOLATE 19.10   VITAMIN B 12 637         Lab 05/19/24  1439 05/17/24  2224   PH, ARTERIAL 7.532*  --    PCO2, ARTERIAL 28.2*  --    PO2 ART 52.9*  --    FIO2 28 28   HCO3 ART 23.6  --    BASE EXCESS ART 1.7  --    CARBOXYHEMOGLOBIN 0.7  --    CARBOXYHEMOGLOBIN (VENOUS)  --  1.1     Brief Urine Lab Results  (Last result in the past 365 days)        Color   Clarity   Blood   Leuk Est   Nitrite   Protein   CREAT   Urine HCG        05/19/24 1601 Yellow   Clear   Large (3+)   Negative   Negative   100 mg/dL (2+)                 COVID19   Date Value Ref Range Status   05/17/2024 Not Detected Not Detected - Ref. Range Final       XR Chest 2 View    Result Date: 5/19/2024  XR CHEST 2 VW Date of Exam: 5/19/2024 2:01 PM EDT Indication: previous pneumonia Dx, dyspnea Comparison: Chest CT 5/17/2024. Findings: Cardiomediastinal silhouette is within normal limits. Right upper lobe calcified granuloma. Confluent airspace disease in the left lower lobe, overall similar  compared to recent chest CT within the confines of modality differences. No evidence of focal consolidation otherwise. Probable trace left pleural effusion. No right pleural effusion. No pneumothorax. Osseous structures are unchanged.     Impression: Impression: Overall similar imaging appearance of patient's left lower lobe pneumonia. No evidence of new or worsening disease in the chest. Electronically Signed: Armando Garcia MD  5/19/2024 2:46 PM EDT  Workstation ID: [id number]    CT Abdomen Pelvis With Contrast    Result Date: 5/17/2024  CT ABDOMEN PELVIS W CONTRAST Date of Exam: 5/17/2024 10:53 PM EDT Indication: diarrhea. Comparison: None available. Technique: Axial CT images were obtained of the abdomen and pelvis following the uneventful intravenous administration of 90 mL Isovue-370. Reconstructed coronal and sagittal images were also obtained. Automated exposure control and iterative construction methods were used. Findings: Lung Bases:   There is left lower lobe airspace disease consistent with pneumonia. There is a trace pleural effusion on the left. Liver: Liver is normal in size and CT density. No focal lesions. Biliary/Gallbladder:  There is density change within the gallbladder which may be small stones or sludge. Spleen: Spleen is normal in size and CT density. Pancreas:  Pancreas is normal. There is no evidence of pancreatic mass or peripancreatic fluid. Kidneys:  Kidneys are normal in size. There are no stones or hydronephrosis. Adrenals:  Adrenal glands are unremarkable. Retroperitoneal/Lymph Nodes/Vasculature:  No retroperitoneal adenopathy is identified. Gastrointestinal/Mesentery:  The bowel loops are non-dilated without wall thickening or mass. There is diverticulosis without diverticulitis. The appendix appears within normal limits. No evidence of obstruction. No free air. No mesenteric fluid collections identified. Bladder:  The bladder is normal. Genital:   Patient is status post hysterectomy.        Bony Structures:   Visualized bony structures are consistent with the patient's age.     Impression: Impression: Left lower lobe pneumonia. No acute abdominal or pelvic abnormality. Electronically Signed: Truong Perez MD  5/17/2024 11:37 PM EDT  Workstation ID: [id number]    CT Angiogram Chest Pulmonary Embolism    Result Date: 5/17/2024  CT ANGIOGRAM CHEST PULMONARY EMBOLISM Date of Exam: 5/17/2024 10:53 PM EDT Indication: hypoxic. Comparison: None available. Technique: Axial CT images were obtained of the chest after the uneventful intravenous administration of 90 mL Isovue-370 utilizing pulmonary embolism protocol.  Reconstructed coronal and sagittal images were also obtained. Automated exposure control and  iterative construction methods were used. Findings: Pulmonary arteries: Adequate opacification of the pulmonary arteries. No evidence of acute pulmonary embolism. Lungs and Pleura: There is diffuse consolidation with air bronchograms involving the left lower lobe. There is a small pleural effusion. There is no pulmonary mass. There are no suspicious pulmonary nodules. Mediastinum/Missy: No mediastinal or hilar lymphadenopathy. Lymph nodes: No axillary or supraclavicular adenopathy. Cardiovascular: The cardiac chambers are within normal limits. The pericardium is normal. The aorta and its arch branch vessels are unremarkable.   Upper Abdomen: The upper abdominal contents are unremarkable.      Bones and Soft Tissue: No suspicious osseous lesion.     Impression: Impression: Diffuse left lower lobe infiltrate with adjacent small effusion. The findings are consistent with pneumonia. There is no pulmonary embolism. Electronically Signed: Truong Perez MD  5/17/2024 11:33 PM EDT  Workstation ID: [id number]             Assessment & Plan   Assessment / Plan       Benign essential HTN    Left lower lobe pneumonia    Hypoxia      Assessment & Plan:  72yo with HO HTN, HLP with lobar pneumonia  --  Probably  streptococcal  -she has had 2 days of cephalosporin,   will give high dose rocephin,   add a dose of vanc for possible Staph and check MRSA swab  --try to increase pulmonary toilet  -IS, P&PD, nebs, oxygen  -- high risk of complication-- discussed with staff, partners, pharmacy    HLP  -cont statin    DVT prophylaxis:  Medical DVT prophylaxis orders are present.        CODE STATUS:FULL CODE     Expected Discharge  Expected Discharge Date: 5/23/2024; Expected Discharge Time:     Electronically signed by Lesia Perea MD 05/19/24 20:22 EDT

## 2024-05-20 NOTE — PROGRESS NOTES
[facility] Medicine Services  PROGRESS NOTE    Patient Name: Love Mackenzie  : 1950  MRN: [id number]    Date of Admission: 2024  Primary Care Physician: Silvia Garcia MD    Subjective   Subjective     CC: Hypoxia    HPI:  Patient was spiking fever overnight with Tmax of 101.  She is up to 6 L of nasal cannula.  Chest x-ray showed worsening pneumonia and pleural effusion.      Objective   Objective     Vital Signs:   Temp:  [98.7 °F (37.1 °C)-103 °F (39.4 °C)] 99.3 °F (37.4 °C)  Heart Rate:  [] 107  Resp:  [20-27] 22  BP: (115-164)/(71-93) 136/82  Flow (L/min):  [2-6] 6     Physical Exam:  Constitutional: Awake, alert, interactive and flat affect  Eyes: clear sclerae, no conjunctival injection  HENT: NCAT, mucous membranes dry  Neck: no masses or lymphadenopathy, trachea midline  Respiratory: diminished breath sounds, splinting, occ wheezing  Cardiovascular: RRR, no murmurs appreciated, palpable peripheral pulses  Abdomen:  soft, no HSM or masses palpable, not tender or distended  Musculoskeletal: No peripheral edema, clubbing or cyanosis  Neurologic:                   Strength symmetric in all extremities                     Cranial Nerves grossly intact, speech clear  Skin: No rashes or jaundice  Psychiatric: simple insight    Results Reviewed:  LAB RESULTS:      Lab 24  1804 24  1404 24  0632 24  0117 24  2206 24  2205 24  1226   WBC  --  10.27 10.63 11.71*  --  12.86* 14.46*   HEMOGLOBIN  --  12.0 11.8* 11.0*  --  13.1 12.8   HEMATOCRIT  --  34.1 34.3 32.0*  --  37.8 37.8   PLATELETS  --  153 139* 136*  --  160 170   NEUTROS ABS  --  8.82* 9.87* 10.81*  --  11.21* 12.84*   IMMATURE GRANS (ABS)  --  0.14* 0.04 0.03  --  0.06* 0.07*   LYMPHS ABS  --  0.48* 0.37* 0.36*  --  0.70 0.50*   MONOS ABS  --  0.82 0.34 0.50  --  0.87 1.02*   EOS ABS  --  0.00 0.00 0.00  --  0.00 0.00   MCV  --  86.1 88.9 88.9  --  87.9  86.9   SED RATE  --  64*  --   --  56*  --   --    CRP  --  22.50*  --   --  32.10*  --   --    PROCALCITONIN  --  0.84* 1.34* 1.22* 1.23*  --   --    LACTATE 1.2 3.1*  --  0.8 1.8  --   --          Lab 05/19/24  1404 05/18/24  0632 05/18/24  0117 05/17/24 2205 05/17/24  1226   SODIUM 130* 134* 130* 126* 129*   POTASSIUM 3.3* 3.7 3.5 3.6 4.5   CHLORIDE 94* 103 97* 91* 95*   CO2 20.3* 18.5* 17.1* 17.8* 20.2*   ANION GAP 15.7* 12.5 15.9* 17.2* 13.8   BUN 13 17 18 22 22   CREATININE 0.70 0.58 0.63 0.85 0.92   EGFR 92.6 96.9 95.0 73.4 66.7   GLUCOSE 126* 153* 120* 120* 123*   CALCIUM 8.4* 8.1* 7.5* 8.8 8.7   MAGNESIUM 2.0  --   --  2.2  --    TSH  --   --   --   --  1.610         Lab 05/19/24  1404 05/18/24  0632 05/18/24 0117 05/17/24 2205 05/17/24  1226   TOTAL PROTEIN 6.2 6.2 5.8* 7.2 7.0   ALBUMIN 3.3* 3.3* 3.1* 3.7 3.8   GLOBULIN 2.9 2.9 2.7 3.5 3.2   ALT (SGPT) 53* 28 26 31 25   AST (SGOT) 77* 35* 33* 38* 24   BILIRUBIN 0.3 0.3 0.4 0.5 0.6   ALK PHOS 77 68 61 77 78         Lab 05/19/24  1621 05/19/24  1404 05/17/24 2206 05/17/24 2205   PROBNP  --  2,771.0* 351.0  --    HSTROP T 71* 76*  --  18*             Lab 05/17/24  1226   FOLATE 19.10   VITAMIN B 12 637         Lab 05/19/24  1439 05/17/24  2224   PH, ARTERIAL 7.532*  --    PCO2, ARTERIAL 28.2*  --    PO2 ART 52.9*  --    FIO2 28 28   HCO3 ART 23.6  --    BASE EXCESS ART 1.7  --    CARBOXYHEMOGLOBIN 0.7  --    CARBOXYHEMOGLOBIN (VENOUS)  --  1.1     Brief Urine Lab Results  (Last result in the past 365 days)        Color   Clarity   Blood   Leuk Est   Nitrite   Protein   CREAT   Urine HCG        05/19/24 2118 Yellow   Cloudy   Small (1+)   Negative   Negative   Trace                   Microbiology Results Abnormal       Procedure Component Value - Date/Time    MRSA Screen, PCR (Inpatient) - Swab, Nares [[id number]]  (Normal) Collected: 05/20/24 0547    Lab Status: Final result Specimen: Swab from Nares Updated: 05/20/24 0831     MRSA PCR Negative     Narrative:      The negative predictive value of this diagnostic test is high and should only be used to consider de-escalating anti-MRSA therapy. A positive result may indicate colonization with MRSA and must be correlated clinically.  MRSA Negative            XR Chest 1 View    Result Date: 5/20/2024  XR CHEST 1 VW Date of Exam: 5/20/2024 5:33 AM EDT Indication: DYSPNEA, CARDIAC ORIGIN SUSPECTED eval for effusion, dense pneumonia hypoxia Comparison: 5/19/2024 Findings: The trachea is midline. There is stable appearance of the cardiopulmonary silhouette. There is interval increase in left lower lobe consolidation with moderate effusion. The right lung is clear     Impression: Impression: Interval increase in left lower lobe opacity compatible with consolidation and moderate effusion Electronically Signed: Shaq Navarro MD  5/20/2024 7:13 AM EDT  Workstation ID: [id number]    XR Chest 2 View    Result Date: 5/19/2024  XR CHEST 2 VW Date of Exam: 5/19/2024 2:01 PM EDT Indication: previous pneumonia Dx, dyspnea Comparison: Chest CT 5/17/2024. Findings: Cardiomediastinal silhouette is within normal limits. Right upper lobe calcified granuloma. Confluent airspace disease in the left lower lobe, overall similar compared to recent chest CT within the confines of modality differences. No evidence of focal consolidation otherwise. Probable trace left pleural effusion. No right pleural effusion. No pneumothorax. Osseous structures are unchanged.     Impression: Impression: Overall similar imaging appearance of patient's left lower lobe pneumonia. No evidence of new or worsening disease in the chest. Electronically Signed: Armando Garcia MD  5/19/2024 2:46 PM EDT  Workstation ID: [id number]         Current medications:  Scheduled Meds:atorvastatin, 10 mg, Oral, Nightly  cefTRIAXone, 2,000 mg, Intravenous, Q24H  enoxaparin, 40 mg, Subcutaneous, Daily  fluconazole, 100 mg, Oral, Q24H  guaiFENesin, 600 mg, Oral,  Q12H  ipratropium-albuterol, 3 mL, Nebulization, Q4H - RT   Followed by  ipratropium-albuterol, 3 mL, Nebulization, 4x Daily - RT  sodium chloride, 10 mL, Intravenous, Q12H  vancomycin, 9.8 mg/kg, Intravenous, Q12H      Continuous Infusions:Pharmacy to dose vancomycin,       PRN Meds:.  acetaminophen    senna-docusate sodium **AND** polyethylene glycol **AND** bisacodyl **AND** bisacodyl    Calcium Replacement - Follow Nurse / BPA Driven Protocol    Hydrocod Brian-Chlorphe Brian ER    ketorolac    Magnesium Standard Dose Replacement - Follow Nurse / BPA Driven Protocol    Pharmacy to dose vancomycin    Phosphorus Replacement - Follow Nurse / BPA Driven Protocol    Potassium Replacement - Follow Nurse / BPA Driven Protocol    sodium chloride    sodium chloride    Assessment & Plan   Assessment & Plan     Active Hospital Problems    Diagnosis  POA    Hypoxia [R09.02]  Yes    Left lower lobe pneumonia [J18.9]  Yes    Benign essential HTN [I10]  Yes      Resolved Hospital Problems    Diagnosis Date Resolved POA    **Pneumonia [J18.9] 05/19/2024 Yes        Brief Hospital Course to date:  Love Mackenzie is a 73 y.o. female who  has a past medical history of Arthritis and Hyperlipidemia. who presented to [place] ED on 5/17 with cough and fever, she stayed overnight and DCd home 5.18. She returned today 5/19 with increasing confusion, was found to have a temp pg 1-34 and to be hypoxic. She was sent to [facility] at the [facility] for admission.     Acute hypoxic respiratory failure:  Community-acquired pneumonia with left lower lobe infiltration  Worsening left lower lobe effusion.  Loculated?  Elevated Pro-Austin and inflammatory markers  -Diffuse left lower lobe infiltrate with adjacent small effusion. The findings are consistent with pneumonia. There is no pulmonary embolism.   -Patient was initially in [facility] ED on the 17th.  She received 2 days of IV cephalosporin then she was discharged home on oral antibiotic.  -Came back with worsening  symptoms.  -White blood count and Pro-Austin improving on presentation.  -Patient requiring 5 to 6 L of oxygen.  -Repeated chest x-ray showednterval increase in left lower lobe opacity compatible with consolidation and moderate effusion.  -Cultures pending.  -Vancomycin added.  Will switch Rocephin to Zosyn.  Add azithromycin to cover atypical bacteria.  -CT chest ordered to rule out loculated pleural effusion.  -Will consider pulmonary consultation pending CT results..       Elevated troponin likely demand secondary to the above.  Elevated proBNP  HLP  -EKG showed normal sinus rhythm with frequent PVCs.  No acute ischemic changes.  -Troponin flat 70s.  -Echo ordered.  -cont statin    Expected Discharge Location and Transportation: TBD  Expected Discharge   Expected Discharge Date: 5/23/2024; Expected Discharge Time:      DVT prophylaxis:  Medical DVT prophylaxis orders are present.         AM-PAC 6 Clicks Score (PT): 22 (05/20/24 0800)    CODE STATUS:   Code Status and Medical Interventions:   Ordered at: 05/19/24 2202     Code Status (Patient has no pulse and is not breathing):    CPR (Attempt to Resuscitate)     Medical Interventions (Patient has pulse or is breathing):    Full Support       Mariela Mckinley MD  05/20/24

## 2024-05-20 NOTE — PROGRESS NOTES
"Pharmacy Consult-Vancomycin Dosing  Love Mackenzie is a  73 y.o. female receiving vancomycin therapy.     Indication: pneumonia  Consulting Provider: hospitalist  ID Consult: No    Goal AUC: 400 - 600 mg/L*hr    Current Antimicrobial Therapy  Anti-Infectives (From admission, onward)      Ordered     Dose/Rate Route Frequency Start Stop    05/19/24 2205  cefTRIAXone (ROCEPHIN) 2,000 mg in sodium chloride 0.9 % 100 mL MBP        Ordering Provider: Lesia Perea MD    2,000 mg  200 mL/hr over 30 Minutes Intravenous Every 24 Hours 05/20/24 1200 05/24/24 1159    05/19/24 2229  vancomycin (VANCOCIN) 1,000 mg in sodium chloride 0.9 % 250 mL IVPB-VTB        Ordering Provider: Patrick Bean PharmD   Placed in \"And\" Linked Group    9.8 mg/kg × 102 kg  250 mL/hr over 60 Minutes Intravenous Every 12 Hours Scheduled 05/20/24 0800 05/22/24 2059 05/19/24 2205  Pharmacy to dose vancomycin        Ordering Provider: Lesia Perea MD     Does not apply Continuous PRN 05/19/24 2203 05/22/24 2202 05/19/24 2026  fluconazole (DIFLUCAN) tablet 100 mg        Ordering Provider: Lesia Perea MD    100 mg Oral Every 24 Hours 05/19/24 2130 05/24/24 2129 05/19/24 1727  vancomycin IVPB 2000 mg in 0.9% Sodium Chloride 500 mL        Ordering Provider: Vicente Mars PA    20 mg/kg × 98.5 kg  250 mL/hr over 120 Minutes Intravenous Once 05/19/24 1745 05/19/24 2018    05/19/24 1358  cefTRIAXone (ROCEPHIN) 2,000 mg in sodium chloride 0.9 % 100 mL MBP        Ordering Provider: Vicente Mars PA    2,000 mg  200 mL/hr over 30 Minutes Intravenous Once 05/19/24 1415 05/19/24 1515    05/19/24 1358  azithromycin (ZITHROMAX) 500 mg in sodium chloride 0.9 % 250 mL IVPB-VTB        Ordering Provider: Vicente Mars PA    500 mg  over 60 Minutes Intravenous Once 05/19/24 1415 05/19/24 1639            Allergies  Allergies as of 05/19/2024    (No Known Allergies)       Labs  Results from last 7 days   Lab Units " "05/19/24  1404 05/18/24  0632 05/18/24  0117   BUN mg/dL 13 17 18   CREATININE mg/dL 0.70 0.58 0.63     Results from last 7 days   Lab Units 05/19/24  1404 05/18/24  0632 05/18/24  0117   WBC 10*3/mm3 10.27 10.63 11.71*       Evaluation of Dosing  Last Dose Received in the ED/Outside Facility:               Vancomycin 2000 mg IV 5/19 at 18:18  Is Patient on Dialysis or Renal Replacement:               No    Ht - 167.6 cm (66\")  Wt - 102 kg (225 lb 11.2 oz)    Estimated Creatinine Clearance: 88.9 mL/min (by C-G formula based on SCr of 0.7 mg/dL).  Intake & Output (last 3 days)         05/17 0701  05/18 0700 05/18 0701  05/19 0700 05/19 0701 05/20 0700    IV Piggyback   350    Total Intake(mL/kg)   350 (3.4)    Urine (mL/kg/hr)   100    Total Output   100    Net   +250                   Microbiology and Radiology  Microbiology Results (last 10 days)       Procedure Component Value - Date/Time    Blood Culture - Blood, Hand, Right [[id number]]  (Normal) Collected: 05/17/24 2222    Lab Status: Preliminary result Specimen: Blood from Hand, Right Updated: 05/19/24 1000     Blood Culture No growth at 24 hours    Blood Culture - Blood, Arm, Left [[id number]]  (Normal) Collected: 05/17/24 2207    Lab Status: Preliminary result Specimen: Blood from Arm, Left Updated: 05/19/24 1000     Blood Culture No growth at 24 hours    COVID-19, FLU A/B, RSV PCR 1 HR TAT - Swab, Nasopharynx [[id number]]  (Normal) Collected: 05/17/24 2205    Lab Status: Final result Specimen: Swab from Nasopharynx Updated: 05/17/24 2305     COVID19 Not Detected     Influenza A PCR Not Detected     Influenza B PCR Not Detected     RSV, PCR Not Detected    Narrative:      Fact sheet for providers: https://www.fda.gov/media/400166/download    Fact sheet for patients: https://www.fda.gov/media/557176/download    Test performed by PCR.            Reported Vancomycin Levels                 InsightRX AUC Calculation:  Current AUC:   -- mg/L*hr    Predicted Steady " State AUC on Current Dose: -- mg/L*hr  _________________________________  Predicted Steady State AUC on New Dose:   448 mg/L*hr    Assessment/Plan:  1. Will give vancomycin 2000 mg IV once (given in ED 5/19 at 18:18)                               followed by      Vancomycin 1000 mg IV q 12 hours    2. Vancomycin trough is scheduled 5/21 at 06:00 prior to the 4th dose. Predicted Steady State AUC at current dose: 448 mg/L*hr.      Patrick Bean, FletcherD, BCPS  5/19/2024  22:29 EDT

## 2024-05-21 ENCOUNTER — APPOINTMENT (OUTPATIENT)
Dept: CARDIOLOGY | Facility: HOSPITAL | Age: 74
DRG: 871 | End: 2024-05-21
Payer: MEDICARE

## 2024-05-21 LAB
ALBUMIN SERPL-MCNC: 2.4 G/DL (ref 3.5–5.2)
ALBUMIN/GLOB SERPL: 1.2 G/DL
ALP SERPL-CCNC: 58 U/L (ref 39–117)
ALT SERPL W P-5'-P-CCNC: 41 U/L (ref 1–33)
ANION GAP SERPL CALCULATED.3IONS-SCNC: 10 MMOL/L (ref 5–15)
AST SERPL-CCNC: 74 U/L (ref 1–32)
BASOPHILS # BLD AUTO: 0.03 10*3/MM3 (ref 0–0.2)
BASOPHILS NFR BLD AUTO: 0.3 % (ref 0–1.5)
BILIRUB SERPL-MCNC: 0.5 MG/DL (ref 0–1.2)
BUN SERPL-MCNC: 7 MG/DL (ref 8–23)
BUN/CREAT SERPL: 12.7 (ref 7–25)
CALCIUM SPEC-SCNC: 7.1 MG/DL (ref 8.6–10.5)
CHLORIDE SERPL-SCNC: 93 MMOL/L (ref 98–107)
CO2 SERPL-SCNC: 26 MMOL/L (ref 22–29)
CREAT SERPL-MCNC: 0.55 MG/DL (ref 0.57–1)
DEPRECATED RDW RBC AUTO: 43 FL (ref 37–54)
EGFRCR SERPLBLD CKD-EPI 2021: 98.1 ML/MIN/1.73
EOSINOPHIL # BLD AUTO: 0 10*3/MM3 (ref 0–0.4)
EOSINOPHIL NFR BLD AUTO: 0 % (ref 0.3–6.2)
ERYTHROCYTE [DISTWIDTH] IN BLOOD BY AUTOMATED COUNT: 13.5 % (ref 12.3–15.4)
GLOBULIN UR ELPH-MCNC: 2 GM/DL
GLUCOSE SERPL-MCNC: 95 MG/DL (ref 65–99)
HCT VFR BLD AUTO: 31.7 % (ref 34–46.6)
HGB BLD-MCNC: 10.8 G/DL (ref 12–15.9)
IMM GRANULOCYTES # BLD AUTO: 0.21 10*3/MM3 (ref 0–0.05)
IMM GRANULOCYTES NFR BLD AUTO: 2.4 % (ref 0–0.5)
LYMPHOCYTES # BLD AUTO: 0.8 10*3/MM3 (ref 0.7–3.1)
LYMPHOCYTES NFR BLD AUTO: 9.2 % (ref 19.6–45.3)
MAGNESIUM SERPL-MCNC: 1.9 MG/DL (ref 1.6–2.4)
MCH RBC QN AUTO: 29.6 PG (ref 26.6–33)
MCHC RBC AUTO-ENTMCNC: 34.1 G/DL (ref 31.5–35.7)
MCV RBC AUTO: 86.8 FL (ref 79–97)
MONOCYTES # BLD AUTO: 0.59 10*3/MM3 (ref 0.1–0.9)
MONOCYTES NFR BLD AUTO: 6.8 % (ref 5–12)
NEUTROPHILS NFR BLD AUTO: 7.05 10*3/MM3 (ref 1.7–7)
NEUTROPHILS NFR BLD AUTO: 81.3 % (ref 42.7–76)
NRBC BLD AUTO-RTO: 0 /100 WBC (ref 0–0.2)
PLATELET # BLD AUTO: 118 10*3/MM3 (ref 140–450)
PMV BLD AUTO: 11.8 FL (ref 6–12)
POTASSIUM SERPL-SCNC: 3.2 MMOL/L (ref 3.5–5.2)
POTASSIUM SERPL-SCNC: 3.5 MMOL/L (ref 3.5–5.2)
PROCALCITONIN SERPL-MCNC: 0.99 NG/ML (ref 0–0.25)
PROT SERPL-MCNC: 4.4 G/DL (ref 6–8.5)
QT INTERVAL: 340 MS
QTC INTERVAL: 429 MS
RBC # BLD AUTO: 3.65 10*6/MM3 (ref 3.77–5.28)
SODIUM SERPL-SCNC: 129 MMOL/L (ref 136–145)
VANCOMYCIN TROUGH SERPL-MCNC: 8.2 MCG/ML (ref 5–20)
WBC NRBC COR # BLD AUTO: 8.68 10*3/MM3 (ref 3.4–10.8)

## 2024-05-21 PROCEDURE — 25010000002 VANCOMYCIN HCL IN NACL 1.5-0.9 GM/500ML-% SOLUTION

## 2024-05-21 PROCEDURE — 93005 ELECTROCARDIOGRAM TRACING: CPT | Performed by: HOSPITALIST

## 2024-05-21 PROCEDURE — 83735 ASSAY OF MAGNESIUM: CPT | Performed by: HOSPITALIST

## 2024-05-21 PROCEDURE — 80053 COMPREHEN METABOLIC PANEL: CPT | Performed by: HOSPITALIST

## 2024-05-21 PROCEDURE — 25010000002 MAGNESIUM SULFATE 2 GM/50ML SOLUTION: Performed by: HOSPITALIST

## 2024-05-21 PROCEDURE — 84145 PROCALCITONIN (PCT): CPT | Performed by: HOSPITALIST

## 2024-05-21 PROCEDURE — 94799 UNLISTED PULMONARY SVC/PX: CPT

## 2024-05-21 PROCEDURE — 99232 SBSQ HOSP IP/OBS MODERATE 35: CPT | Performed by: HOSPITALIST

## 2024-05-21 PROCEDURE — 80202 ASSAY OF VANCOMYCIN: CPT

## 2024-05-21 PROCEDURE — 25010000002 LEVOFLOXACIN PER 250 MG: Performed by: INTERNAL MEDICINE

## 2024-05-21 PROCEDURE — 84132 ASSAY OF SERUM POTASSIUM: CPT | Performed by: HOSPITALIST

## 2024-05-21 PROCEDURE — 25810000003 LACTATED RINGERS PER 1000 ML: Performed by: HOSPITALIST

## 2024-05-21 PROCEDURE — 97162 PT EVAL MOD COMPLEX 30 MIN: CPT

## 2024-05-21 PROCEDURE — 93010 ELECTROCARDIOGRAM REPORT: CPT | Performed by: INTERNAL MEDICINE

## 2024-05-21 PROCEDURE — 25010000002 PIPERACILLIN SOD-TAZOBACTAM PER 1 G: Performed by: HOSPITALIST

## 2024-05-21 PROCEDURE — 85025 COMPLETE CBC W/AUTO DIFF WBC: CPT | Performed by: HOSPITALIST

## 2024-05-21 PROCEDURE — 25010000002 ENOXAPARIN PER 10 MG: Performed by: INTERNAL MEDICINE

## 2024-05-21 PROCEDURE — 94667 MNPJ CHEST WALL 1ST: CPT

## 2024-05-21 RX ORDER — MAGNESIUM SULFATE HEPTAHYDRATE 40 MG/ML
2 INJECTION, SOLUTION INTRAVENOUS ONCE
Status: COMPLETED | OUTPATIENT
Start: 2024-05-21 | End: 2024-05-21

## 2024-05-21 RX ORDER — POTASSIUM CHLORIDE 20 MEQ/1
40 TABLET, EXTENDED RELEASE ORAL EVERY 4 HOURS
Status: COMPLETED | OUTPATIENT
Start: 2024-05-21 | End: 2024-05-21

## 2024-05-21 RX ORDER — SODIUM CHLORIDE, SODIUM LACTATE, POTASSIUM CHLORIDE, CALCIUM CHLORIDE 600; 310; 30; 20 MG/100ML; MG/100ML; MG/100ML; MG/100ML
75 INJECTION, SOLUTION INTRAVENOUS CONTINUOUS
Status: DISCONTINUED | OUTPATIENT
Start: 2024-05-21 | End: 2024-05-22

## 2024-05-21 RX ORDER — LEVOFLOXACIN 5 MG/ML
750 INJECTION, SOLUTION INTRAVENOUS EVERY 24 HOURS
Qty: 1050 ML | Refills: 0 | Status: DISCONTINUED | OUTPATIENT
Start: 2024-05-21 | End: 2024-05-23 | Stop reason: HOSPADM

## 2024-05-21 RX ORDER — POTASSIUM CHLORIDE 20 MEQ/1
40 TABLET, EXTENDED RELEASE ORAL EVERY 4 HOURS
Qty: 4 TABLET | Refills: 0 | Status: COMPLETED | OUTPATIENT
Start: 2024-05-21 | End: 2024-05-21

## 2024-05-21 RX ORDER — VANCOMYCIN/0.9 % SOD CHLORIDE 1.5G/250ML
1500 PLASTIC BAG, INJECTION (ML) INTRAVENOUS EVERY 12 HOURS SCHEDULED
Status: DISCONTINUED | OUTPATIENT
Start: 2024-05-21 | End: 2024-05-21

## 2024-05-21 RX ORDER — METOPROLOL TARTRATE 1 MG/ML
5 INJECTION, SOLUTION INTRAVENOUS EVERY 6 HOURS PRN
Status: DISCONTINUED | OUTPATIENT
Start: 2024-05-21 | End: 2024-05-23 | Stop reason: HOSPADM

## 2024-05-21 RX ADMIN — IPRATROPIUM BROMIDE AND ALBUTEROL SULFATE 3 ML: 2.5; .5 SOLUTION RESPIRATORY (INHALATION) at 09:40

## 2024-05-21 RX ADMIN — POTASSIUM CHLORIDE 40 MEQ: 1500 TABLET, EXTENDED RELEASE ORAL at 23:59

## 2024-05-21 RX ADMIN — POTASSIUM CHLORIDE 40 MEQ: 1500 TABLET, EXTENDED RELEASE ORAL at 20:13

## 2024-05-21 RX ADMIN — Medication 10 ML: at 21:27

## 2024-05-21 RX ADMIN — GUAIFENESIN 600 MG: 600 TABLET, EXTENDED RELEASE ORAL at 08:33

## 2024-05-21 RX ADMIN — LEVOFLOXACIN 750 MG: 750 INJECTION, SOLUTION INTRAVENOUS at 17:46

## 2024-05-21 RX ADMIN — ENOXAPARIN SODIUM 40 MG: 100 INJECTION SUBCUTANEOUS at 08:33

## 2024-05-21 RX ADMIN — PIPERACILLIN AND TAZOBACTAM 3.38 G: 3; .375 INJECTION, POWDER, LYOPHILIZED, FOR SOLUTION INTRAVENOUS at 01:01

## 2024-05-21 RX ADMIN — MAGNESIUM SULFATE HEPTAHYDRATE 2 G: 2 INJECTION, SOLUTION INTRAVENOUS at 15:34

## 2024-05-21 RX ADMIN — PIPERACILLIN AND TAZOBACTAM 3.38 G: 3; .375 INJECTION, POWDER, LYOPHILIZED, FOR SOLUTION INTRAVENOUS at 08:34

## 2024-05-21 RX ADMIN — POTASSIUM CHLORIDE 40 MEQ: 1500 TABLET, EXTENDED RELEASE ORAL at 08:33

## 2024-05-21 RX ADMIN — GUAIFENESIN 600 MG: 600 TABLET, EXTENDED RELEASE ORAL at 20:13

## 2024-05-21 RX ADMIN — Medication 10 ML: at 08:34

## 2024-05-21 RX ADMIN — Medication 1500 MG: at 08:53

## 2024-05-21 RX ADMIN — ATORVASTATIN CALCIUM 10 MG: 10 TABLET, FILM COATED ORAL at 20:13

## 2024-05-21 RX ADMIN — POTASSIUM CHLORIDE 40 MEQ: 1500 TABLET, EXTENDED RELEASE ORAL at 12:57

## 2024-05-21 RX ADMIN — SODIUM CHLORIDE, POTASSIUM CHLORIDE, SODIUM LACTATE AND CALCIUM CHLORIDE 75 ML/HR: 600; 310; 30; 20 INJECTION, SOLUTION INTRAVENOUS at 15:29

## 2024-05-21 NOTE — THERAPY EVALUATION
Patient Name: Love Mackenzie  : 1950    MRN: [id number]                              Today's Date: 2024       Admit Date: 2024    Visit Dx:     ICD-10-CM ICD-9-CM   1. Sepsis due to pneumonia  J18.9 486    A41.9 995.91   2. Acute hypoxic respiratory failure  J96.01 518.81     Patient Active Problem List   Diagnosis    Benign essential HTN    Left lower lobe pneumonia    Hypoxia     Past Medical History:   Diagnosis Date    Arthritis     Hyperlipidemia      Past Surgical History:   Procedure Laterality Date    BREAST LUMPECTOMY      BUNIONECTOMY Left 2023    COLONOSCOPY  2013    HYSTERECTOMY  1986    LYMPH NODE BIOPSY      TONSILLECTOMY        General Information       Row Name 24 1120          Physical Therapy Time and Intention    Document Type evaluation  -KW     Mode of Treatment individual therapy;physical therapy  -       Row Name 24 1120          General Information    Patient Profile Reviewed yes  -KW     Prior Level of Function independent:;all household mobility;community mobility;gait;transfer;bed mobility  -KW     Existing Precautions/Restrictions fall  -KW     Barriers to Rehab medically complex  -       Row Name 24 1120          Living Environment    People in Home spouse  -       Row Name 24 1120          Home Main Entrance    Number of Stairs, Main Entrance five  -KW     Stair Railings, Main Entrance railings on both sides of stairs  -       Row Name 24 1120          Stairs Within Home, Primary    Number of Stairs, Within Home, Primary --  can live on main level  -       Row Name 24 1120          Cognition    Orientation Status (Cognition) oriented x 3  -KW       Row Name 24 1120          Safety Issues, Functional Mobility    Impairments Affecting Function (Mobility) balance;endurance/activity tolerance;pain;shortness of breath;strength  -KW               User Key  (r) = Recorded By, (t) = Taken By, (c) = Cosigned By       Initials Name Provider Type    KW Mellisa Vasquez PT Physical Therapist                   Mobility       Row Name 05/21/24 1120          Bed Mobility    Bed Mobility supine-sit  -KW     Supine-Sit [place] (Bed Mobility) supervision  -KW       Row Name 05/21/24 1120          Sit-Stand Transfer    Sit-Stand [place] (Transfers) supervision  -KW       Row Name 05/21/24 1120          Gait/Stairs (Locomotion)    [place] Level (Gait) supervision  -KW     Assistive Device (Gait) walker, front-wheeled  -KW     Deviations/Abnormal Patterns (Gait) stride length decreased;gait speed decreased  -KW     Bilateral Gait Deviations forward flexed posture  -KW               User Key  (r) = Recorded By, (t) = Taken By, (c) = Cosigned By      Initials Name Provider Type    KW Mellisa Vasquez PT Physical Therapist                   Obj/Interventions       Row Name 05/21/24 1120          Strength Comprehensive (MMT)    General Manual Muscle Testing (MMT) Assessment lower extremity strength deficits identified  -KW     Comment, General Manual Muscle Testing (MMT) Assessment 4+/5 BLE  -KW       Row Name 05/21/24 1120          Balance    Balance Assessment sitting static balance;sitting dynamic balance;sit to stand dynamic balance;standing static balance;standing dynamic balance  -KW     Static Sitting Balance supervision  -KW     Dynamic Sitting Balance supervision  -KW     Position, Sitting Balance sitting edge of bed;unsupported  -KW     Static Standing Balance supervision  -KW     Dynamic Standing Balance supervision  -KW     Position/Device Used, Standing Balance supported;walker, front-wheeled  -KW     Balance Interventions sitting;standing;sit to stand  -KW               User Key  (r) = Recorded By, (t) = Taken By, (c) = Cosigned By      Initials Name Provider Type    KW Mellisa Vasquez PT Physical Therapist                   Goals/Plan       Row Name 05/21/24 1120          Bed Mobility Goal 1 (PT)     Activity/Assistive Device (Bed Mobility Goal 1, PT) sit to supine;supine to sit  -KW     [place] Level/Cues Needed (Bed Mobility Goal 1, PT) independent  -KW     Time Frame (Bed Mobility Goal 1, PT) 10 days  -KW       Row Name 05/21/24 1120          Transfer Goal 1 (PT)    Activity/Assistive Device (Transfer Goal 1, PT) sit-to-stand/stand-to-sit;bed-to-chair/chair-to-bed  -KW     [place] Level/Cues Needed (Transfer Goal 1, PT) modified independence  -KW     Time Frame (Transfer Goal 1, PT) 10 days  -KW       Row Name 05/21/24 1120          Gait Training Goal 1 (PT)    Activity/Assistive Device (Gait Training Goal 1, PT) assistive device use;decrease fall risk;gait (walking locomotion);diminish gait deviation;improve balance and speed;increase endurance/gait distance;walker, rolling  -KW     [place] Level (Gait Training Goal 1, PT) modified independence  -KW     Distance (Gait Training Goal 1, PT) 300  -KW     Time Frame (Gait Training Goal 1, PT) 10 days  -KW               User Key  (r) = Recorded By, (t) = Taken By, (c) = Cosigned By      Initials Name Provider Type    KW Mellisa Vasquez, PT Physical Therapist                   Clinical Impression       Row Name 05/21/24 1120          Pain    Pretreatment Pain Rating 0/10 - no pain  -KW       Row Name 05/21/24 1120          Plan of Care Review    Plan of Care Reviewed With patient  -KW     Progress no change  -KW     Outcome Evaluation PT eval completed. Patient ambulates with flexed posture and rwx pushed far in front of her. Patient presents below baseline for functional mobility. Patient demonstrates decreased activity tolerance, deconditioning and reduced dynamic balance. Patient would continue to benefit from skilled PT services to improve dynamic balance with gait, transfers strength, and activity tolerance training in order to build endurance and reduce risk of falls.  -KW       Row Name 05/21/24 1120          Therapy Assessment/Plan (PT)     Rehab Potential (PT) good, to achieve stated therapy goals  -KW     Criteria for Skilled Interventions Met (PT) yes;skilled treatment is necessary  -KW     Therapy Frequency (PT) daily  -KW       Row Name 05/21/24 1120          Vital Signs    Pre Systolic BP Rehab 133  -KW     Pre Treatment Diastolic BP 88  -KW     Pretreatment Heart Rate (beats/min) 90  -KW       Row Name 05/21/24 1120          Positioning and Restraints    Pre-Treatment Position in bed  -KW     Post Treatment Position chair  -KW     In Chair reclined;call light within reach;encouraged to call for assist;exit alarm on;legs elevated  -KW               User Key  (r) = Recorded By, (t) = Taken By, (c) = Cosigned By      Initials Name Provider Type    Mellisa Reyes PT Physical Therapist                   Outcome Measures       Row Name 05/21/24 1120          How much help from another person do you currently need...    Turning from your back to your side while in flat bed without using bedrails? 4  -KW     Moving from lying on back to sitting on the side of a flat bed without bedrails? 4  -KW     Moving to and from a bed to a chair (including a wheelchair)? 3  -KW     Standing up from a chair using your arms (e.g., wheelchair, bedside chair)? 3  -KW     Climbing 3-5 steps with a railing? 3  -KW     To walk in hospital room? 3  -KW     AM-PAC 6 Clicks Score (PT) 20  -KW     Highest Level of Mobility Goal 6 --> Walk 10 steps or more  -KW       Row Name 05/21/24 1120          Functional Assessment    Outcome Measure Options AM-PAC 6 Clicks Basic Mobility (PT)  -KW               User Key  (r) = Recorded By, (t) = Taken By, (c) = Cosigned By      Initials Name Provider Type    Mellisa Reyes PT Physical Therapist                                   PT Recommendation and Plan     Plan of Care Reviewed With: patient  Progress: no change  Outcome Evaluation: PT eval completed. Patient ambulates with flexed posture and rwx pushed far in  front of her. Patient presents below baseline for functional mobility. Patient demonstrates decreased activity tolerance, deconditioning and reduced dynamic balance. Patient would continue to benefit from skilled PT services to improve dynamic balance with gait, transfers strength, and activity tolerance training in order to build endurance and reduce risk of falls.     Time Calculation:   PT Evaluation Complexity  History, PT Evaluation Complexity: 3 or more personal factors and/or comorbidities  Examination of Body Systems (PT Eval Complexity): total of 3 or more elements  Clinical Presentation (PT Evaluation Complexity): evolving  Clinical Decision Making (PT Evaluation Complexity): moderate complexity  Overall Complexity (PT Evaluation Complexity): moderate complexity     PT Charges       Row Name 05/21/24 1120             Time Calculation    Start Time 1120  -KW      PT Received On 05/21/24  -KW      PT Goal Re-Cert Due Date 05/31/24  -KW         Untimed Charges    PT Eval/Re-eval Minutes 54  -KW         Total Minutes    Untimed Charges Total Minutes 54  -KW       Total Minutes 54  -KW                User Key  (r) = Recorded By, (t) = Taken By, (c) = Cosigned By      Initials Name Provider Type    Mellisa Reyes PT Physical Therapist                  Therapy Charges for Today       Code Description Service Date Service Provider Modifiers Qty    [id number] HC PT EVAL MOD COMPLEXITY 4 5/21/2024 Mellisa Vasquez PT GP 1            PT G-Codes  Outcome Measure Options: AM-PAC 6 Clicks Basic Mobility (PT)  AM-PAC 6 Clicks Score (PT): 20  PT Discharge Summary  Anticipated Discharge Disposition (PT): home with home health    Mellisa Vasquez PT  5/21/2024

## 2024-05-21 NOTE — PROGRESS NOTES
[facility] Medicine Services  PROGRESS NOTE    Patient Name: Love Mackenzie  : 1950  MRN: [id number]    Date of Admission: 2024  Primary Care Physician: Silvia Garcia MD    Subjective   Subjective     CC: Hypoxia    HPI:  No acute events over the night.  Afebrile.  Patient on 5 L this morning down from 6 L yesterday.  Repeated CT chest showed dense left lower lobe consolidation compatible with left lower lobe pneumonia and small left sided pleural effusion without loculation.  Urine antigen is positive for Legionella.  Patient on azithromycin 500 mg.  Pro-Austin is mildly up to 0.9 from 0.8.  White blood count within normal.  Sodium 129, potassium 3.2.  ID consulted.      Objective   Objective     Vital Signs:   Temp:  [98.5 °F (36.9 °C)-99.9 °F (37.7 °C)] 99.9 °F (37.7 °C)  Heart Rate:  [] 95  Resp:  [18-22] 18  BP: (132-159)/(80-93) 133/83  Flow (L/min):  [5-6] 5     Physical Exam:  Constitutional: Awake, alert, interactive and flat affect  Eyes: clear sclerae, no conjunctival injection  HENT: NCAT, mucous membranes dry  Neck: no masses or lymphadenopathy, trachea midline  Respiratory: diminished breath sounds, splinting, occ wheezing  Cardiovascular: RRR, no murmurs appreciated, palpable peripheral pulses  Abdomen:  soft, no HSM or masses palpable, not tender or distended  Musculoskeletal: No peripheral edema, clubbing or cyanosis  Neurologic:                   Strength symmetric in all extremities                     Cranial Nerves grossly intact, speech clear  Skin: No rashes or jaundice  Psychiatric: simple insight    Results Reviewed:  LAB RESULTS:      Lab 24  0602 24  0924 24  1804 24  1404 24  0632 24  0117 24  2206   WBC 8.68 10.22  --  10.27 10.63 11.71*  --    HEMOGLOBIN 10.8* 11.7*  --  12.0 11.8* 11.0*  --    HEMATOCRIT 31.7* 34.9  --  34.1 34.3 32.0*  --    PLATELETS 118* 133*  --  153 139* 136*  --     NEUTROS ABS 7.05* 8.91*  --  8.82* 9.87* 10.81*  --    IMMATURE GRANS (ABS) 0.21* 0.17*  --  0.14* 0.04 0.03  --    LYMPHS ABS 0.80 0.71  --  0.48* 0.37* 0.36*  --    MONOS ABS 0.59 0.40  --  0.82 0.34 0.50  --    EOS ABS 0.00 0.00  --  0.00 0.00 0.00  --    MCV 86.8 87.7  --  86.1 88.9 88.9  --    SED RATE  --   --   --  64*  --   --  56*   CRP  --   --   --  22.50*  --   --  32.10*   PROCALCITONIN 0.99*  --   --  0.84* 1.34* 1.22* 1.23*   LACTATE  --   --  1.2 3.1*  --  0.8 1.8         Lab 05/21/24  0602 05/20/24  0924 05/19/24  1404 05/18/24  0632 05/18/24  0117 05/17/24  2205 05/17/24  1226   SODIUM 129* 132* 130* 134* 130* 126* 129*   POTASSIUM 3.2* 3.7 3.3* 3.7 3.5 3.6 4.5   CHLORIDE 93* 96* 94* 103 97* 91* 95*   CO2 26.0 25.0 20.3* 18.5* 17.1* 17.8* 20.2*   ANION GAP 10.0 11.0 15.7* 12.5 15.9* 17.2* 13.8   BUN 7* 8 13 17 18 22 22   CREATININE 0.55* 0.69 0.70 0.58 0.63 0.85 0.92   EGFR 98.1 92.9 92.6 96.9 95.0 73.4 66.7   GLUCOSE 95 124* 126* 153* 120* 120* 123*   CALCIUM 7.1* 7.6* 8.4* 8.1* 7.5* 8.8 8.7   MAGNESIUM  --   --  2.0  --   --  2.2  --    TSH  --   --   --   --   --   --  1.610         Lab 05/21/24  0602 05/20/24  0924 05/19/24  1404 05/18/24  0632 05/18/24  0117   TOTAL PROTEIN 4.4* 4.9* 6.2 6.2 5.8*   ALBUMIN 2.4* 2.9* 3.3* 3.3* 3.1*   GLOBULIN 2.0 2.0 2.9 2.9 2.7   ALT (SGPT) 41* 51* 53* 28 26   AST (SGOT) 74* 76* 77* 35* 33*   BILIRUBIN 0.5 0.4 0.3 0.3 0.4   ALK PHOS 58 66 77 68 61         Lab 05/19/24  1621 05/19/24  1404 05/17/24  2206 05/17/24  2205   PROBNP  --  2,771.0* 351.0  --    HSTROP T 71* 76*  --  18*             Lab 05/17/24  1226   FOLATE 19.10   VITAMIN B 12 637         Lab 05/19/24  1439 05/17/24  2224   PH, ARTERIAL 7.532*  --    PCO2, ARTERIAL 28.2*  --    PO2 ART 52.9*  --    FIO2 28 28   HCO3 ART 23.6  --    BASE EXCESS ART 1.7  --    CARBOXYHEMOGLOBIN 0.7  --    CARBOXYHEMOGLOBIN (VENOUS)  --  1.1     Brief Urine Lab Results  (Last result in the past 365 days)         Color   Clarity   Blood   Leuk Est   Nitrite   Protein   CREAT   Urine HCG        05/19/24 2118 Yellow   Cloudy   Small (1+)   Negative   Negative   Trace                   Microbiology Results Abnormal       Procedure Component Value - Date/Time    Blood Culture - Blood, Hand, Left [[id number]]  (Normal) Collected: 05/19/24 1405    Lab Status: Preliminary result Specimen: Blood from Hand, Left Updated: 05/20/24 2030     Blood Culture No growth at 24 hours    Blood Culture - Blood, Hand, Right [[id number]]  (Normal) Collected: 05/19/24 1400    Lab Status: Preliminary result Specimen: Blood from Hand, Right Updated: 05/20/24 2030     Blood Culture No growth at 24 hours    S. Pneumo Ag Urine or CSF - Urine, Urine, Clean Catch [[id number]]  (Normal) Collected: 05/19/24 1601    Lab Status: Final result Specimen: Urine, Clean Catch Updated: 05/20/24 1113     Strep Pneumo Ag Negative    Respiratory Panel PCR w/COVID-19(SARS-CoV-2) BACILIO/VALERI/NAHED/PAD/COR/MEÑO In-House, NP Swab in UTM/VTM, 2 HR TAT - Swab, Nasopharynx [[id number]]  (Normal) Collected: 05/20/24 0938    Lab Status: Final result Specimen: Swab from Nasopharynx Updated: 05/20/24 1039     ADENOVIRUS, PCR Not Detected     Coronavirus 229E Not Detected     Coronavirus HKU1 Not Detected     Coronavirus NL63 Not Detected     Coronavirus OC43 Not Detected     COVID19 Not Detected     Human Metapneumovirus Not Detected     Human Rhinovirus/Enterovirus Not Detected     Influenza A PCR Not Detected     Influenza B PCR Not Detected     Parainfluenza Virus 1 Not Detected     Parainfluenza Virus 2 Not Detected     Parainfluenza Virus 3 Not Detected     Parainfluenza Virus 4 Not Detected     RSV, PCR Not Detected     Bordetella pertussis pcr Not Detected     Bordetella parapertussis PCR Not Detected     Chlamydophila pneumoniae PCR Not Detected     Mycoplasma pneumo by PCR Not Detected    Narrative:      In the setting of a positive respiratory panel with a viral infection PLUS a  negative procalcitonin without other underlying concern for bacterial infection, consider observing off antibiotics or discontinuation of antibiotics and continue supportive care. If the respiratory panel is positive for atypical bacterial infection (Bordetella pertussis, Chlamydophila pneumoniae, or Mycoplasma pneumoniae), consider antibiotic de-escalation to target atypical bacterial infection.    MRSA Screen, PCR (Inpatient) - Swab, Nares [[id number]]  (Normal) Collected: 05/20/24 0547    Lab Status: Final result Specimen: Swab from Nares Updated: 05/20/24 0831     MRSA PCR Negative    Narrative:      The negative predictive value of this diagnostic test is high and should only be used to consider de-escalating anti-MRSA therapy. A positive result may indicate colonization with MRSA and must be correlated clinically.  MRSA Negative            CT Chest Without Contrast Diagnostic    Result Date: 5/20/2024  CT CHEST WO CONTRAST DIAGNOSTIC Date of Exam: 5/20/2024 10:36 AM EDT Indication: Worsening left lower lobe pneumonia with worsening effusion.  Rule out empyema. Comparison: Chest radiograph dated 5/20/2024, CT chest dated 5/17/2024 Technique: Axial CT images were obtained of the chest without contrast administration.  Reconstructed coronal and sagittal images were also obtained. Automated exposure control and iterative construction methods were used. Findings: The visualized soft tissue structures at the base of the neck including the thyroid appear within normal limits. There is no lower cervical or axillary adenopathy. The heart size is normal. There is no pericardial effusion. The aorta is normal in caliber without evidence of aneurysm formation. There is mild dilation of the main pulmonary artery. There is a partially calcified lower right paratracheal lymph node and  reactive mediastinal and left hilar lymph nodes likely related to acute pulmonary process. There is dense consolidation within the left lower  lobe likely representing pneumonia. There is only a small left-sided pleural effusion. This is predominantly within the posterior superior aspect of the left hemithorax. There is no abnormal pleural thickening. There is a small amount of dependent atelectasis within the left upper lobe and right lower lobe. There is no evidence of pneumothorax. The esophagus is normal in course and caliber. Visualized portions of the upper abdomen demonstrate no acute findings. There are multilevel degenerative changes of the thoracic spine. No suspicious lytic or sclerotic osseous lesion within the thorax.     Impression: Impression: 1. Dense left lower lobe consolidation with air bronchograms compatible with left lower lobe pneumonia. 2. Small left-sided pleural effusion which is predominantly posterior and superior within the left hemithorax. There is no associated pleural thickening or clear organization. Electronically Signed: Aidan Gallego  5/20/2024 12:52 PM EDT  Workstation ID: [id number]    XR Chest 1 View    Result Date: 5/20/2024  XR CHEST 1 VW Date of Exam: 5/20/2024 5:33 AM EDT Indication: DYSPNEA, CARDIAC ORIGIN SUSPECTED eval for effusion, dense pneumonia hypoxia Comparison: 5/19/2024 Findings: The trachea is midline. There is stable appearance of the cardiopulmonary silhouette. There is interval increase in left lower lobe consolidation with moderate effusion. The right lung is clear     Impression: Impression: Interval increase in left lower lobe opacity compatible with consolidation and moderate effusion Electronically Signed: Shaq Navarro MD  5/20/2024 7:13 AM EDT  Workstation ID: [id number]    XR Chest 2 View    Result Date: 5/19/2024  XR CHEST 2 VW Date of Exam: 5/19/2024 2:01 PM EDT Indication: previous pneumonia Dx, dyspnea Comparison: Chest CT 5/17/2024. Findings: Cardiomediastinal silhouette is within normal limits. Right upper lobe calcified granuloma. Confluent airspace disease in the left lower lobe,  overall similar compared to recent chest CT within the confines of modality differences. No evidence of focal consolidation otherwise. Probable trace left pleural effusion. No right pleural effusion. No pneumothorax. Osseous structures are unchanged.     Impression: Impression: Overall similar imaging appearance of patient's left lower lobe pneumonia. No evidence of new or worsening disease in the chest. Electronically Signed: Armando Garcia MD  5/19/2024 2:46 PM EDT  Workstation ID: [id number]         Current medications:  Scheduled Meds:atorvastatin, 10 mg, Oral, Nightly  azithromycin, 500 mg, Intravenous, Q24H  enoxaparin, 40 mg, Subcutaneous, Daily  fluconazole, 100 mg, Oral, Q24H  guaiFENesin, 600 mg, Oral, Q12H  ipratropium-albuterol, 3 mL, Nebulization, 4x Daily - RT  piperacillin-tazobactam, 3.375 g, Intravenous, Q8H  potassium chloride ER, 40 mEq, Oral, Q4H  sodium chloride, 10 mL, Intravenous, Q12H  vancomycin, 1,500 mg, Intravenous, Q12H      Continuous Infusions:Pharmacy to dose vancomycin,       PRN Meds:.  acetaminophen    senna-docusate sodium **AND** polyethylene glycol **AND** bisacodyl **AND** bisacodyl    Calcium Replacement - Follow Nurse / BPA Driven Protocol    Hydrocod Brian-Chlorphe Brian ER    ketorolac    Magnesium Standard Dose Replacement - Follow Nurse / BPA Driven Protocol    Pharmacy to dose vancomycin    Phosphorus Replacement - Follow Nurse / BPA Driven Protocol    Potassium Replacement - Follow Nurse / BPA Driven Protocol    sodium chloride    sodium chloride    Assessment & Plan   Assessment & Plan     Active Hospital Problems    Diagnosis  POA    Hypoxia [R09.02]  Yes    Left lower lobe pneumonia [J18.9]  Yes    Benign essential HTN [I10]  Yes      Resolved Hospital Problems    Diagnosis Date Resolved POA    **Pneumonia [J18.9] 05/19/2024 Yes        Brief Hospital Course to date:  Love Mackenzie is a 73 y.o. female who  has a past medical history of Arthritis and Hyperlipidemia. who  presented to [place] ED on 5/17 with cough and fever, she stayed overnight and DCd home 5.18. She returned today 5/19 with increasing confusion, was found to have a temp pg 1-34 and to be hypoxic. She was sent to [facility] for admission.     Acute hypoxic respiratory failure:  Community-acquired pneumonia with left lower lobe infiltration secondary to Legionella infection  Worsening left lower lobe effusion.  Loculated?  Elevated Pro-Austin and inflammatory markers   -Patient was initially in [facility] ED on the 17th.  She received 2 days of IV cephalosporin then she was discharged home on oral antibiotic.  -Came back with worsening symptoms.  -Patient was requiring 5 to 6 L of oxygen.  -Repeated chest x-ray showednterval increase in left lower lobe opacity compatible with consolidation and moderate effusion.  -Vancomycin added.  Rocephin switched to Zosyn.  Add azithromycin to cover atypical bacteria on 05/20.  Plan:  Afebrile. Patient on 5 L this morning down from 6 L yesterday.  Repeated CT chest showed dense left lower lobe consolidation compatible with left lower lobe pneumonia and small left sided pleural effusion without loculation.  Urine antigen is positive for Legionella.  Patient on azithromycin 500 mg.  Pro-Austin is mildly up to 0.9 from 0.8.  White blood count within normal.  Sodium 129, potassium 3.2.  ID consulted.    Mild hyponatremia: Likely secondary to Legionella infection.  -Monitor with daily BMP    Elevated troponin likely demand secondary to the above.  Elevated proBNP  HLP  -EKG showed normal sinus rhythm with frequent PVCs.  No acute ischemic changes.  -Troponin flat 70s.  -Echo ordered.  -cont statin    Expected Discharge Location and Transportation: TBD  Expected Discharge   Expected Discharge Date: 5/23/2024; Expected Discharge Time:      DVT prophylaxis:  Medical DVT prophylaxis orders are present.         AM-PAC 6 Clicks Score (PT): 22 (05/20/24 2000)    CODE STATUS:   Code Status and Medical  Interventions:   Ordered at: 05/19/24 2205     Code Status (Patient has no pulse and is not breathing):    CPR (Attempt to Resuscitate)     Medical Interventions (Patient has pulse or is breathing):    Full Support       Mariela Mckinley MD  05/21/24

## 2024-05-21 NOTE — PLAN OF CARE
Goal Outcome Evaluation:  Plan of Care Reviewed With: patient        Progress: no change  Outcome Evaluation: PT eval completed. Patient ambulates with flexed posture and rwx pushed far in front of her. Patient presents below baseline for functional mobility. Patient demonstrates decreased activity tolerance, deconditioning and reduced dynamic balance. Patient would continue to benefit from skilled PT services to improve dynamic balance with gait, transfers strength, and activity tolerance training in order to build endurance and reduce risk of falls.      Anticipated Discharge Disposition (PT): home with home health

## 2024-05-21 NOTE — CONSULTS
INFECTIOUS DISEASE CONSULT/INITIAL HOSPITAL VISIT    Love Mackenzie  1950  [id number]    Date of Consult: 5/21/2024    Admission Date: 5/19/2024      Requesting Provider: No ref. provider found  Evaluating Physician: Ghassan Aparicio MD    Reason for Consultation: pneumonia     History of present illness: Arthritis, hyperlipidemia formally healthy presents to [facility] emergency room on May 17 had fever stayed overnight and then was discharged fevers continued patient now being admitted on 5/19.  Patient had fatigue diarrhea cough dyspnea this started 1 week ago.  Patient had oxygen of 88% on room air CTA chest performed rule out pulmonary embolism which showed left lower lobe infiltrate patient started on Rocephin and azithromycin.  Patient discharged on Omnicef.    Procalcitonin from 5/18/2024 1.34    Patient reports recent travel to [place] to visit family patient stayed at a hotel with no indoor window air conditioning unit.    Patient denies known exposure to fountains or recirculating water however patient did visit a casino when in [place] unaware if she was around any water displaced there    Urine Legionella performed was positive              Patient is a 73 y.o. female    Past Medical History:   Diagnosis Date    Arthritis     Hyperlipidemia        Past Surgical History:   Procedure Laterality Date    BREAST LUMPECTOMY      BUNIONECTOMY Left 01/2023    COLONOSCOPY  2013    HYSTERECTOMY  1986    LYMPH NODE BIOPSY      TONSILLECTOMY         Family History   Problem Relation Age of Onset    Stroke Mother     COPD Mother     Vision loss Mother     Cancer Father     COPD Father     Liver disease Brother        Social History     Socioeconomic History    Marital status:    Tobacco Use    Smoking status: Never     Passive exposure: Never    Smokeless tobacco: Never   Vaping Use    Vaping status: Never Used   Substance and Sexual Activity    Alcohol use: Yes     Alcohol/week:  2.0 standard drinks of alcohol     Types: 2 Glasses of wine per week     Comment: 2-3    Drug use: Never    Sexual activity: Not Currently     Partners: Male     Birth control/protection: Hysterectomy       No Known Allergies      Medication:    Current Facility-Administered Medications:     acetaminophen (TYLENOL) tablet 650 mg, 650 mg, Oral, Q4H PRN, Lesia Perea MD, 650 mg at 05/20/24 0539    atorvastatin (LIPITOR) tablet 10 mg, 10 mg, Oral, Nightly, Lesia Perea MD, 10 mg at 05/20/24 2255    azithromycin (ZITHROMAX) 500 mg in sodium chloride 0.9 % 250 mL IVPB-VTB, 500 mg, Intravenous, Q24H, Mariela Mckinley MD, 500 mg at 05/20/24 1407    sennosides-docusate (PERICOLACE) 8.6-50 MG per tablet 2 tablet, 2 tablet, Oral, BID PRN **AND** polyethylene glycol (MIRALAX) packet 17 g, 17 g, Oral, Daily PRN **AND** bisacodyl (DULCOLAX) EC tablet 5 mg, 5 mg, Oral, Daily PRN **AND** bisacodyl (DULCOLAX) suppository 10 mg, 10 mg, Rectal, Daily PRN, Lesia Perea MD    Calcium Replacement - Follow Nurse / BPA Driven Protocol, , Does not apply, PRN, Lesia Perea MD    Enoxaparin Sodium (LOVENOX) syringe 40 mg, 40 mg, Subcutaneous, Daily, Lesia Perea MD, 40 mg at 05/21/24 0833    guaiFENesin (MUCINEX) 12 hr tablet 600 mg, 600 mg, Oral, Q12H, oRbert Abrams MD, 600 mg at 05/21/24 0833    Hydrocod Brian-Chlorphe Brian ER (TUSSIONEX PENNKINETIC) 10-8 MG/5ML ER suspension 2.5 mL, 2.5 mL, Oral, Q12H PRN, Lesia Perea MD, 2.5 mL at 05/19/24 2227    ketorolac (TORADOL) injection 15 mg, 15 mg, Intravenous, Q6H PRN, Lesia Perea MD    Magnesium Standard Dose Replacement - Follow Nurse / BPA Driven Protocol, , Does not apply, PRNEliazar Kathryn E, MD    metoprolol tartrate (LOPRESSOR) injection 5 mg, 5 mg, Intravenous, Q6H PRN, Mariela Mckinley MD    Phosphorus Replacement - Follow Nurse / BPA Driven Protocol, , Does not apply, PRN, Lesia Perea MD    piperacillin-tazobactam (ZOSYN) 3.375 g IVPB in  100 mL NS MBP (CD), 3.375 g, Intravenous, Q8H, Mariela Mckinley MD, 3.375 g at 05/21/24 0834    Potassium Replacement - Follow Nurse / BPA Driven Protocol, , Does not apply, Eliazar REID Kathryn E, MD    sodium chloride 0.9 % flush 10 mL, 10 mL, Intravenous, Q12H, Lesia Perea MD, 10 mL at 05/21/24 0834    sodium chloride 0.9 % flush 10 mL, 10 mL, Intravenous, Eliazar REID Kathryn E, MD    sodium chloride 0.9 % infusion 40 mL, 40 mL, Intravenous, Eliazar REID Kathryn E, MD    Antibiotics:  Anti-Infectives (From admission, onward)      Ordered     Dose/Rate Route Frequency Start Stop    05/20/24 0902  piperacillin-tazobactam (ZOSYN) 3.375 g IVPB in 100 mL NS MBP (CD)        Ordering Provider: Mariela Mckinley MD    3.375 g  over 4 Hours Intravenous Every 8 Hours 05/20/24 1600 05/27/24 1559    05/20/24 0901  azithromycin (ZITHROMAX) 500 mg in sodium chloride 0.9 % 250 mL IVPB-VTB        Ordering Provider: Mariela Mckinley MD    500 mg  over 60 Minutes Intravenous Every 24 Hours 05/20/24 1500 05/23/24 1459    05/20/24 0902  piperacillin-tazobactam (ZOSYN) 3.375 g IVPB in 100 mL NS MBP (CD)        Ordering Provider: Mariela Mckinley MD    3.375 g  over 30 Minutes Intravenous Once 05/20/24 1000 05/20/24 1139    05/19/24 1727  vancomycin IVPB 2000 mg in 0.9% Sodium Chloride 500 mL        Ordering Provider: Vicente Mars PA    20 mg/kg × 98.5 kg  250 mL/hr over 120 Minutes Intravenous Once 05/19/24 1745 05/19/24 2018    05/19/24 1358  cefTRIAXone (ROCEPHIN) 2,000 mg in sodium chloride 0.9 % 100 mL MBP        Ordering Provider: Vicente Mars PA    2,000 mg  200 mL/hr over 30 Minutes Intravenous Once 05/19/24 1415 05/19/24 1515    05/19/24 1358  azithromycin (ZITHROMAX) 500 mg in sodium chloride 0.9 % 250 mL IVPB-VTB        Ordering Provider: Vicente Mars PA    500 mg  over 60 Minutes Intravenous Once 05/19/24 1415 05/19/24 1639              Review of Systems:  Reports cough fatigue fever confusion  hypoxia    Physical Exam:   Vital Signs  Temp (24hrs), Av.6 °F (37.6 °C), Min:99 °F (37.2 °C), Max:99.9 °F (37.7 °C)    Temp  Min: 99 °F (37.2 °C)  Max: 99.9 °F (37.7 °C)  BP  Min: 132/80  Max: 146/88  Pulse  Min: 91  Max: 167  Resp  Min: 18  Max: 21  SpO2  Min: 89 %  Max: 94 %    GENERAL: Awake and alert, in no acute distress.   HEENT: Normocephalic, atraumatic.  PERRL. EOMI. No conjunctival injection. No icterus.  On oxygen by nasal cannula  NECK: Supple without nuchal rigidity. No mass.  LYMPH: No cervical, axillary or inguinal lymphadenopathy.  HEART: RRR; No murmur, rubs, gallops.   LUNGS: Clear to auscultation bilaterally   ABDOMEN: Soft, nontender, nondistended.   EXT:  No edema left lower lobe infiltrate  :  Without Smiley catheter.  MSK: No joint effusions or erythema  SKIN: Warm and dry without cutaneous eruptions on Inspection/palpation.    NEURO: Oriented to PPT.  Motor 5/5 strength  PSYCHIATRIC: Normal insight and judgment. Cooperative with PE    Laboratory Data    Results from last 7 days   Lab Units 24  0602 24  0924 24  1404   WBC 10*3/mm3 8.68 10.22 10.27   HEMOGLOBIN g/dL 10.8* 11.7* 12.0   HEMATOCRIT % 31.7* 34.9 34.1   PLATELETS 10*3/mm3 118* 133* 153     Results from last 7 days   Lab Units 24  0602   SODIUM mmol/L 129*   POTASSIUM mmol/L 3.2*   CHLORIDE mmol/L 93*   CO2 mmol/L 26.0   BUN mg/dL 7*   CREATININE mg/dL 0.55*   GLUCOSE mg/dL 95   CALCIUM mg/dL 7.1*     Results from last 7 days   Lab Units 24  0602   ALK PHOS U/L 58   BILIRUBIN mg/dL 0.5   ALT (SGPT) U/L 41*   AST (SGOT) U/L 74*     Results from last 7 days   Lab Units 24  1404   SED RATE mm/hr 64*     Results from last 7 days   Lab Units 24  1404   CRP mg/dL 22.50*     Results from last 7 days   Lab Units 24  1804   LACTATE mmol/L 1.2         Results from last 7 days   Lab Units 24  0602   VANCOMYCIN TR mcg/mL 8.20     Estimated Creatinine Clearance: 113.2 mL/min (A) (by C-G  "formula based on SCr of 0.55 mg/dL (L)).      Microbiology:  Blood Culture   Date Value Ref Range Status   05/19/2024 No growth at 24 hours  Preliminary     No results found for: \"BCIDPCR\", \"CXREFLEX\", \"CSFCX\", \"CULTURETIS\"  No results found for: \"CULTURES\", \"HSVCX\", \"URCX\"  No results found for: \"EYECULTURE\", \"GCCX\", \"HSVCULTURE\", \"LABHSV\"  No results found for: \"LEGIONELLA\", \"MRSACX\", \"MUMPSCX\", \"MYCOPLASCX\"  No results found for: \"NOCARDIACX\", \"STOOLCX\"  No results found for: \"THROATCX\", \"UNSTIMCULT\", \"URINECX\", \"CULTURE\", \"VZVCULTUR\"  No results found for: \"VIRALCULTU\", \"WOUNDCX\"        Radiology:  Imaging Results (Last 72 Hours)       Procedure Component Value Units Date/Time    CT Chest Without Contrast Diagnostic [[id number]] Collected: 05/20/24 1243     Updated: 05/20/24 1255    Narrative:      CT CHEST WO CONTRAST DIAGNOSTIC    Date of Exam: 5/20/2024 10:36 AM EDT    Indication: Worsening left lower lobe pneumonia with worsening effusion.  Rule out empyema.    Comparison: Chest radiograph dated 5/20/2024, CT chest dated 5/17/2024    Technique: Axial CT images were obtained of the chest without contrast administration.  Reconstructed coronal and sagittal images were also obtained. Automated exposure control and iterative construction methods were used.    Findings:  The visualized soft tissue structures at the base of the neck including the thyroid appear within normal limits. There is no lower cervical or axillary adenopathy.    The heart size is normal. There is no pericardial effusion. The aorta is normal in caliber without evidence of aneurysm formation. There is mild dilation of the main pulmonary artery. There is a partially calcified lower right paratracheal lymph node and   reactive mediastinal and left hilar lymph nodes likely related to acute pulmonary process.    There is dense consolidation within the left lower lobe likely representing pneumonia. There is only a small left-sided pleural effusion. " This is predominantly within the posterior superior aspect of the left hemithorax. There is no abnormal pleural   thickening. There is a small amount of dependent atelectasis within the left upper lobe and right lower lobe. There is no evidence of pneumothorax.    The esophagus is normal in course and caliber. Visualized portions of the upper abdomen demonstrate no acute findings. There are multilevel degenerative changes of the thoracic spine. No suspicious lytic or sclerotic osseous lesion within the thorax.      Impression:      Impression:  1. Dense left lower lobe consolidation with air bronchograms compatible with left lower lobe pneumonia.  2. Small left-sided pleural effusion which is predominantly posterior and superior within the left hemithorax. There is no associated pleural thickening or clear organization.        Electronically Signed: Aidan Brambilaelor    5/20/2024 12:52 PM EDT    Workstation ID: [id number]    XR Chest 1 View [[id number]] Collected: 05/20/24 0712     Updated: 05/20/24 0716    Narrative:      XR CHEST 1 VW    Date of Exam: 5/20/2024 5:33 AM EDT    Indication: DYSPNEA, CARDIAC ORIGIN SUSPECTED  eval for effusion, dense pneumonia  hypoxia    Comparison: 5/19/2024    Findings:  The trachea is midline. There is stable appearance of the cardiopulmonary silhouette. There is interval increase in left lower lobe consolidation with moderate effusion. The right lung is clear      Impression:      Impression:  Interval increase in left lower lobe opacity compatible with consolidation and moderate effusion      Electronically Signed: Shaq Navarro MD    5/20/2024 7:13 AM EDT    Workstation ID: [id number]    XR Chest 2 View [[id number]] Collected: 05/19/24 1444     Updated: 05/19/24 1449    Narrative:      XR CHEST 2 VW    Date of Exam: 5/19/2024 2:01 PM EDT    Indication: previous pneumonia Dx, dyspnea    Comparison: Chest CT 5/17/2024.    Findings:  Cardiomediastinal silhouette is within normal limits.  Right upper lobe calcified granuloma. Confluent airspace disease in the left lower lobe, overall similar compared to recent chest CT within the confines of modality differences. No evidence of focal   consolidation otherwise. Probable trace left pleural effusion. No right pleural effusion. No pneumothorax. Osseous structures are unchanged.      Impression:      Impression:  Overall similar imaging appearance of patient's left lower lobe pneumonia. No evidence of new or worsening disease in the chest.      Electronically Signed: Armando Garcia MD    5/19/2024 2:46 PM EDT    Workstation ID: [id number]              Impression:   Left lower lobe infiltrate  Legionella pneumonia  Fever  Lactic acidosis concerning for tissue perfusion mismatch  Hyponatremia  Cough  Fatigue  PLAN/RECOMMENDATIONS:   Thank you for asking us to see Love Mackenzie, I recommend the following:    Patient has not surprisingly worsened since discharge from [facility] emergency room on a cephalosporin.    Drug of choice will be either levofloxacin or azithromycin.  Tetracyclines are also an option for patients with severe allergies or intolerances to the first 2 antibiotics    Assuming the patient is on immunocompromise should respond to 7 to 10 days of therapy    ISAK rosas  D/c zosyn  Start levaquin 750 mg iv daily       Ghassan Aparicio MD  5/21/2024  15:08 EDT

## 2024-05-21 NOTE — PROGRESS NOTES
"Pharmacy Consult-Vancomycin Dosing  Love Mackenzie is a  73 y.o. female receiving vancomycin therapy.     Indication: Pneumonia  Consulting Provider: Hospitalist  ID Consult: No    Goal AUC: 400 - 600 mg/L*hr    Current Antimicrobial Therapy  Azithromycin 500mg IV q24h  Fluconazole 100mg PO q24h  Zosyn 3.375g IV q8h  Vancomycin 1000mg q12h    Allergies  Allergies as of 05/19/2024    (No Known Allergies)     Labs  Results from last 7 days   Lab Units 05/21/24  0602 05/20/24  0924 05/19/24  1404   BUN mg/dL 7* 8 13   CREATININE mg/dL 0.55* 0.69 0.70     Results from last 7 days   Lab Units 05/21/24  0602 05/20/24  0924 05/19/24  1404   WBC 10*3/mm3 8.68 10.22 10.27     Evaluation of Dosing    Is Patient on Dialysis or Renal Replacement: No      Ht - 167.6 cm (66\")  Wt - 102 kg (225 lb 11.2 oz)    Estimated Creatinine Clearance: 113.2 mL/min (A) (by C-G formula based on SCr of 0.55 mg/dL (L)).  Intake & Output (last 3 days)         05/17 0701  05/18 0700 05/18 0701  05/19 0700 05/19 0701  05/20 0700    IV Piggyback   350    Total Intake(mL/kg)   350 (3.4)    Urine (mL/kg/hr)   100    Total Output   100    Net   +250                 Microbiology and Radiology  Microbiology Results (last 10 days)       Procedure Component Value - Date/Time    Respiratory Panel PCR w/COVID-19(SARS-CoV-2) BACILIO/VALERI/NAHED/PAD/COR/MEÑO In-House, NP Swab in UTM/VTM, 2 HR TAT - Swab, Nasopharynx [[id number]]  (Normal) Collected: 05/20/24 0938    Lab Status: Final result Specimen: Swab from Nasopharynx Updated: 05/20/24 1039     ADENOVIRUS, PCR Not Detected     Coronavirus 229E Not Detected     Coronavirus HKU1 Not Detected     Coronavirus NL63 Not Detected     Coronavirus OC43 Not Detected     COVID19 Not Detected     Human Metapneumovirus Not Detected     Human Rhinovirus/Enterovirus Not Detected     Influenza A PCR Not Detected     Influenza B PCR Not Detected     Parainfluenza Virus 1 Not Detected     Parainfluenza Virus 2 Not Detected     " Parainfluenza Virus 3 Not Detected     Parainfluenza Virus 4 Not Detected     RSV, PCR Not Detected     Bordetella pertussis pcr Not Detected     Bordetella parapertussis PCR Not Detected     Chlamydophila pneumoniae PCR Not Detected     Mycoplasma pneumo by PCR Not Detected    Narrative:      In the setting of a positive respiratory panel with a viral infection PLUS a negative procalcitonin without other underlying concern for bacterial infection, consider observing off antibiotics or discontinuation of antibiotics and continue supportive care. If the respiratory panel is positive for atypical bacterial infection (Bordetella pertussis, Chlamydophila pneumoniae, or Mycoplasma pneumoniae), consider antibiotic de-escalation to target atypical bacterial infection.    MRSA Screen, PCR (Inpatient) - Swab, Nares [[id number]]  (Normal) Collected: 05/20/24 0547    Lab Status: Final result Specimen: Swab from Nares Updated: 05/20/24 0831     MRSA PCR Negative    Narrative:      The negative predictive value of this diagnostic test is high and should only be used to consider de-escalating anti-MRSA therapy. A positive result may indicate colonization with MRSA and must be correlated clinically.  MRSA Negative    S. Pneumo Ag Urine or CSF - Urine, Urine, Clean Catch [[id number]]  (Normal) Collected: 05/19/24 1601    Lab Status: Final result Specimen: Urine, Clean Catch Updated: 05/20/24 1113     Strep Pneumo Ag Negative    Legionella Antigen, Urine - Urine, Urine, Clean Catch [[id number]]  (Abnormal) Collected: 05/19/24 1601    Lab Status: Final result Specimen: Urine, Clean Catch Updated: 05/20/24 1113     LEGIONELLA ANTIGEN, URINE Positive    Blood Culture - Blood, Hand, Left [[id number]]  (Normal) Collected: 05/19/24 1405    Lab Status: Preliminary result Specimen: Blood from Hand, Left Updated: 05/20/24 2030     Blood Culture No growth at 24 hours    Blood Culture - Blood, Hand, Right [[id number]]  (Normal) Collected: 05/19/24  1400    Lab Status: Preliminary result Specimen: Blood from Hand, Right Updated: 05/20/24 2030     Blood Culture No growth at 24 hours    Blood Culture - Blood, Hand, Right [[id number]]  (Normal) Collected: 05/17/24 2222    Lab Status: Preliminary result Specimen: Blood from Hand, Right Updated: 05/20/24 1000     Blood Culture No growth at 2 days    Blood Culture - Blood, Arm, Left [[id number]]  (Normal) Collected: 05/17/24 2207    Lab Status: Preliminary result Specimen: Blood from Arm, Left Updated: 05/20/24 1000     Blood Culture No growth at 2 days    COVID-19, FLU A/B, RSV PCR 1 HR TAT - Swab, Nasopharynx [[id number]]  (Normal) Collected: 05/17/24 2205    Lab Status: Final result Specimen: Swab from Nasopharynx Updated: 05/17/24 2305     COVID19 Not Detected     Influenza A PCR Not Detected     Influenza B PCR Not Detected     RSV, PCR Not Detected    Narrative:      Fact sheet for providers: https://www.fda.gov/media/937099/download    Fact sheet for patients: https://www.fda.gov/media/224108/download    Test performed by PCR.          Reported Vancomycin Levels          Results from last 7 days   Lab Units 05/21/24  0602   VANCOMYCIN TR mcg/mL 8.20     [employer] AUC Calculation:    Current AUC: 264 mg/L*hr    Predicted Steady State AUC on Current Dose: 263 mg/L*hr  _________________________________    Predicted Steady State AUC on New Dose: 394 mg/L*hr    Assessment/Plan:  Pharmacy to dose vancomycin for pneumonia.  Goal AUC: 400-600  5/21 WBC 8.68  5/21 Scr 0.55  5/21 Vancomycin level 8.2 mcg/mL  Vancomycin level drawn 6 hours following last dose.    Adjust to Vancomycin 1500mg q12h based on patient's current levels, renal function, and age.  Next vancomycin level due 5/23 @ 0600  Ordered BMP x 3 days  Continue to monitor renal function, cultures and sensitivities, and clinical status.  Pharmacy will continue to follow and adjust dosing as necessary.    Larisa Nolasco, Pharmacy Intern   5/21/2024  08:20 EDT

## 2024-05-22 ENCOUNTER — APPOINTMENT (OUTPATIENT)
Dept: CARDIOLOGY | Facility: HOSPITAL | Age: 74
DRG: 871 | End: 2024-05-22
Payer: MEDICARE

## 2024-05-22 LAB
ALBUMIN SERPL-MCNC: 2.3 G/DL (ref 3.5–5.2)
ALBUMIN/GLOB SERPL: 0.8 G/DL
ALP SERPL-CCNC: 62 U/L (ref 39–117)
ALT SERPL W P-5'-P-CCNC: 85 U/L (ref 1–33)
ANION GAP SERPL CALCULATED.3IONS-SCNC: 6 MMOL/L (ref 5–15)
ASCENDING AORTA: 3.1 CM
AST SERPL-CCNC: 160 U/L (ref 1–32)
BH CV ECHO MEAS - AO MAX PG: 4.7 MMHG
BH CV ECHO MEAS - AO MEAN PG: 3 MMHG
BH CV ECHO MEAS - AO ROOT DIAM: 3.4 CM
BH CV ECHO MEAS - AO V2 MAX: 108 CM/SEC
BH CV ECHO MEAS - AO V2 VTI: 19.8 CM
BH CV ECHO MEAS - AVA(I,D): 2.8 CM2
BH CV ECHO MEAS - EDV(CUBED): 110.6 ML
BH CV ECHO MEAS - EDV(MOD-SP2): 82.3 ML
BH CV ECHO MEAS - EDV(MOD-SP4): 85.8 ML
BH CV ECHO MEAS - EF(MOD-BP): 52.7 %
BH CV ECHO MEAS - EF(MOD-SP2): 54.9 %
BH CV ECHO MEAS - EF(MOD-SP4): 51.2 %
BH CV ECHO MEAS - ESV(CUBED): 32.8 ML
BH CV ECHO MEAS - ESV(MOD-SP2): 37.1 ML
BH CV ECHO MEAS - ESV(MOD-SP4): 41.9 ML
BH CV ECHO MEAS - FS: 33.3 %
BH CV ECHO MEAS - IVS/LVPW: 0.75 CM
BH CV ECHO MEAS - IVSD: 0.6 CM
BH CV ECHO MEAS - LA DIMENSION: 2.9 CM
BH CV ECHO MEAS - LAT PEAK E' VEL: 15.3 CM/SEC
BH CV ECHO MEAS - LV DIASTOLIC VOL/BSA (35-75): 40.8 CM2
BH CV ECHO MEAS - LV MASS(C)D: 106.9 GRAMS
BH CV ECHO MEAS - LV MAX PG: 4.1 MMHG
BH CV ECHO MEAS - LV MEAN PG: 2 MMHG
BH CV ECHO MEAS - LV SYSTOLIC VOL/BSA (12-30): 19.9 CM2
BH CV ECHO MEAS - LV V1 MAX: 101 CM/SEC
BH CV ECHO MEAS - LV V1 VTI: 17.8 CM
BH CV ECHO MEAS - LVIDD: 4.8 CM
BH CV ECHO MEAS - LVIDS: 3.2 CM
BH CV ECHO MEAS - LVOT AREA: 3.1 CM2
BH CV ECHO MEAS - LVOT DIAM: 2 CM
BH CV ECHO MEAS - LVPWD: 0.8 CM
BH CV ECHO MEAS - MED PEAK E' VEL: 11.4 CM/SEC
BH CV ECHO MEAS - MV A MAX VEL: 98.5 CM/SEC
BH CV ECHO MEAS - MV DEC SLOPE: 364.5 CM/SEC2
BH CV ECHO MEAS - MV DEC TIME: 0.22 SEC
BH CV ECHO MEAS - MV E MAX VEL: 80.2 CM/SEC
BH CV ECHO MEAS - MV E/A: 0.81
BH CV ECHO MEAS - MV MAX PG: 4.6 MMHG
BH CV ECHO MEAS - MV MEAN PG: 3 MMHG
BH CV ECHO MEAS - MV P1/2T: 73.8 MSEC
BH CV ECHO MEAS - MV V2 VTI: 25.8 CM
BH CV ECHO MEAS - MVA(P1/2T): 3 CM2
BH CV ECHO MEAS - MVA(VTI): 2.17 CM2
BH CV ECHO MEAS - PA ACC TIME: 0.17 SEC
BH CV ECHO MEAS - PA V2 MAX: 89 CM/SEC
BH CV ECHO MEAS - RAP SYSTOLE: 3 MMHG
BH CV ECHO MEAS - RVSP: 22 MMHG
BH CV ECHO MEAS - SV(LVOT): 55.9 ML
BH CV ECHO MEAS - SV(MOD-SP2): 45.2 ML
BH CV ECHO MEAS - SV(MOD-SP4): 43.9 ML
BH CV ECHO MEAS - SVI(LVOT): 26.6 ML/M2
BH CV ECHO MEAS - SVI(MOD-SP2): 21.5 ML/M2
BH CV ECHO MEAS - SVI(MOD-SP4): 20.9 ML/M2
BH CV ECHO MEAS - TR MAX PG: 19 MMHG
BH CV ECHO MEAS - TR MAX VEL: 218 CM/SEC
BH CV ECHO MEASUREMENTS AVERAGE E/E' RATIO: 6.01
BH CV VAS BP LEFT ARM: NORMAL MMHG
BH CV XLRA - RV BASE: 2.8 CM
BH CV XLRA - RV LENGTH: 6 CM
BH CV XLRA - RV MID: 2.7 CM
BH CV XLRA - TDI S': 16.9 CM/SEC
BILIRUB SERPL-MCNC: 0.4 MG/DL (ref 0–1.2)
BUN SERPL-MCNC: 12 MG/DL (ref 8–23)
BUN/CREAT SERPL: 13 (ref 7–25)
C DIFF TOX GENS STL QL NAA+PROBE: NOT DETECTED
CALCIUM SPEC-SCNC: 7.9 MG/DL (ref 8.6–10.5)
CHLORIDE SERPL-SCNC: 99 MMOL/L (ref 98–107)
CO2 SERPL-SCNC: 27 MMOL/L (ref 22–29)
CREAT SERPL-MCNC: 0.92 MG/DL (ref 0.57–1)
EGFRCR SERPLBLD CKD-EPI 2021: 66.7 ML/MIN/1.73
GLOBULIN UR ELPH-MCNC: 2.8 GM/DL
GLUCOSE SERPL-MCNC: 115 MG/DL (ref 65–99)
IVRT: 92 MS
LEFT ATRIUM VOLUME INDEX: 20.1 ML/M2
MAGNESIUM SERPL-MCNC: 2.4 MG/DL (ref 1.6–2.4)
PHOSPHATE SERPL-MCNC: 2.5 MG/DL (ref 2.5–4.5)
POTASSIUM SERPL-SCNC: 4.5 MMOL/L (ref 3.5–5.2)
POTASSIUM SERPL-SCNC: 4.5 MMOL/L (ref 3.5–5.2)
PROT SERPL-MCNC: 5.1 G/DL (ref 6–8.5)
SODIUM SERPL-SCNC: 132 MMOL/L (ref 136–145)

## 2024-05-22 PROCEDURE — 93306 TTE W/DOPPLER COMPLETE: CPT | Performed by: INTERNAL MEDICINE

## 2024-05-22 PROCEDURE — 83735 ASSAY OF MAGNESIUM: CPT | Performed by: HOSPITALIST

## 2024-05-22 PROCEDURE — 84100 ASSAY OF PHOSPHORUS: CPT | Performed by: HOSPITALIST

## 2024-05-22 PROCEDURE — 25810000003 LACTATED RINGERS PER 1000 ML: Performed by: HOSPITALIST

## 2024-05-22 PROCEDURE — 25010000002 LEVOFLOXACIN PER 250 MG: Performed by: INTERNAL MEDICINE

## 2024-05-22 PROCEDURE — 25010000002 ENOXAPARIN PER 10 MG: Performed by: INTERNAL MEDICINE

## 2024-05-22 PROCEDURE — 93306 TTE W/DOPPLER COMPLETE: CPT

## 2024-05-22 PROCEDURE — 84132 ASSAY OF SERUM POTASSIUM: CPT | Performed by: HOSPITALIST

## 2024-05-22 PROCEDURE — 25010000002 FUROSEMIDE PER 20 MG: Performed by: HOSPITALIST

## 2024-05-22 PROCEDURE — 87493 C DIFF AMPLIFIED PROBE: CPT | Performed by: HOSPITALIST

## 2024-05-22 PROCEDURE — 80053 COMPREHEN METABOLIC PANEL: CPT | Performed by: HOSPITALIST

## 2024-05-22 PROCEDURE — 99232 SBSQ HOSP IP/OBS MODERATE 35: CPT | Performed by: HOSPITALIST

## 2024-05-22 RX ORDER — FUROSEMIDE 10 MG/ML
40 INJECTION INTRAMUSCULAR; INTRAVENOUS ONCE
Status: COMPLETED | OUTPATIENT
Start: 2024-05-22 | End: 2024-05-22

## 2024-05-22 RX ORDER — LOPERAMIDE HYDROCHLORIDE 2 MG/1
2 CAPSULE ORAL 3 TIMES DAILY
Status: DISCONTINUED | OUTPATIENT
Start: 2024-05-22 | End: 2024-05-23 | Stop reason: HOSPADM

## 2024-05-22 RX ADMIN — ENOXAPARIN SODIUM 40 MG: 100 INJECTION SUBCUTANEOUS at 08:17

## 2024-05-22 RX ADMIN — SODIUM CHLORIDE, POTASSIUM CHLORIDE, SODIUM LACTATE AND CALCIUM CHLORIDE 75 ML/HR: 600; 310; 30; 20 INJECTION, SOLUTION INTRAVENOUS at 06:44

## 2024-05-22 RX ADMIN — GUAIFENESIN 600 MG: 600 TABLET, EXTENDED RELEASE ORAL at 08:17

## 2024-05-22 RX ADMIN — ATORVASTATIN CALCIUM 10 MG: 10 TABLET, FILM COATED ORAL at 21:34

## 2024-05-22 RX ADMIN — GUAIFENESIN 600 MG: 600 TABLET, EXTENDED RELEASE ORAL at 21:34

## 2024-05-22 RX ADMIN — Medication 10 ML: at 21:35

## 2024-05-22 RX ADMIN — LEVOFLOXACIN 750 MG: 750 INJECTION, SOLUTION INTRAVENOUS at 16:48

## 2024-05-22 RX ADMIN — Medication 10 ML: at 08:19

## 2024-05-22 RX ADMIN — LOPERAMIDE HYDROCHLORIDE 2 MG: 2 CAPSULE ORAL at 21:35

## 2024-05-22 RX ADMIN — FUROSEMIDE 40 MG: 10 INJECTION, SOLUTION INTRAMUSCULAR; INTRAVENOUS at 10:24

## 2024-05-22 RX ADMIN — LOPERAMIDE HYDROCHLORIDE 2 MG: 2 CAPSULE ORAL at 10:24

## 2024-05-22 RX ADMIN — LOPERAMIDE HYDROCHLORIDE 2 MG: 2 CAPSULE ORAL at 14:22

## 2024-05-22 NOTE — CASE MANAGEMENT/SOCIAL WORK
Discharge Planning Assessment  [facility]     Patient Name: Love Mackenzie  MRN: [id number]  Today's Date: 5/22/2024    Admit Date: 5/19/2024    Plan: HOME +/- HH   Discharge Needs Assessment    No documentation.                  Discharge Plan       Row Name 05/22/24 1301       Plan    Plan HOME +/- HH    Plan Comments Discussed in MDR.  ID following.  Cont IV Levaquin for 7-10 days.  ECHO today.  Pt currently remains on O2 at 5lpm/NC.  Therapy recs HH.  CM will cont to follow for DC needs as indicated.    Final Discharge Disposition Code 01 - home or self-care                  Continued Care and Services - Admitted Since 5/19/2024    No active coordination exists for this encounter.       Expected Discharge Date and Time       Expected Discharge Date Expected Discharge Time    May 23, 2024            Demographic Summary    No documentation.                  Functional Status    No documentation.                  Psychosocial    No documentation.                  Abuse/Neglect    No documentation.                  Legal    No documentation.                  Substance Abuse    No documentation.                  Patient Forms    No documentation.                     Margarita Enamorado RN

## 2024-05-22 NOTE — PROGRESS NOTES
INFECTIOUS DISEASE CONSULT/INITIAL HOSPITAL VISIT    Love Mackenzie  1950  [id number]    Date of Consult: 5/22/2024    Admission Date: 5/19/2024      Requesting Provider: No ref. provider found  Evaluating Physician: Ghassan Aparicio MD    Reason for Consultation: pneumonia     History of present illness: Arthritis, hyperlipidemia formally healthy presents to [facility] emergency room on May 17 had fever stayed overnight and then was discharged fevers continued patient now being admitted on 5/19.  Patient had fatigue diarrhea cough dyspnea this started 1 week ago.  Patient had oxygen of 88% on room air CTA chest performed rule out pulmonary embolism which showed left lower lobe infiltrate patient started on Rocephin and azithromycin.  Patient discharged on Omnicef.    Procalcitonin from 5/18/2024 1.34    Patient reports recent travel to [place] to visit family patient stayed at a hotel with no indoor window air conditioning unit.    Patient denies known exposure to fountains or recirculating water however patient did visit a casino when in [place] unaware if she was around any water displaced there    Urine Legionella performed was positive        5/22/24; doing well; no events overnight; no fever, rash, sore throat, no diarrhea    Past Medical History:   Diagnosis Date    Arthritis     Hyperlipidemia        Past Surgical History:   Procedure Laterality Date    BREAST LUMPECTOMY      BUNIONECTOMY Left 01/2023    COLONOSCOPY  2013    HYSTERECTOMY  1986    LYMPH NODE BIOPSY      TONSILLECTOMY         Family History   Problem Relation Age of Onset    Stroke Mother     COPD Mother     Vision loss Mother     Cancer Father     COPD Father     Liver disease Brother        Social History     Socioeconomic History    Marital status:    Tobacco Use    Smoking status: Never     Passive exposure: Never    Smokeless tobacco: Never   Vaping Use    Vaping status: Never Used   Substance and  Sexual Activity    Alcohol use: Yes     Alcohol/week: 2.0 standard drinks of alcohol     Types: 2 Glasses of wine per week     Comment: 2-3    Drug use: Never    Sexual activity: Not Currently     Partners: Male     Birth control/protection: Hysterectomy       No Known Allergies      Medication:    Current Facility-Administered Medications:     acetaminophen (TYLENOL) tablet 650 mg, 650 mg, Oral, Q4H PRN, Lesia Perea MD, 650 mg at 05/20/24 0539    atorvastatin (LIPITOR) tablet 10 mg, 10 mg, Oral, Nightly, Lesia Perea MD, 10 mg at 05/21/24 2013    sennosides-docusate (PERICOLACE) 8.6-50 MG per tablet 2 tablet, 2 tablet, Oral, BID PRN **AND** polyethylene glycol (MIRALAX) packet 17 g, 17 g, Oral, Daily PRN **AND** bisacodyl (DULCOLAX) EC tablet 5 mg, 5 mg, Oral, Daily PRN **AND** bisacodyl (DULCOLAX) suppository 10 mg, 10 mg, Rectal, Daily PRN, Lesia Perea MD    Calcium Replacement - Follow Nurse / BPA Driven Protocol, , Does not apply, PRN, Lesia Perea MD    Enoxaparin Sodium (LOVENOX) syringe 40 mg, 40 mg, Subcutaneous, Daily, Lesia Perea MD, 40 mg at 05/22/24 0817    guaiFENesin (MUCINEX) 12 hr tablet 600 mg, 600 mg, Oral, Q12H, Robert Abrams MD, 600 mg at 05/22/24 0817    Hydrocod Brian-Chlorphe Brian ER (TUSSIONEX PENNKINETIC) 10-8 MG/5ML ER suspension 2.5 mL, 2.5 mL, Oral, Q12H PRN, Lesia Perea MD, 2.5 mL at 05/19/24 2227    levoFLOXacin (LEVAQUIN) 750 mg/150 mL D5W (premix) (LEVAQUIN) 750 mg, 750 mg, Intravenous, Q24H, Ghassan Aparicio MD, Last Rate: 100 mL/hr at 05/22/24 1648, 750 mg at 05/22/24 1648    loperamide (IMODIUM) capsule 2 mg, 2 mg, Oral, TID, Mariela Mckinley MD, 2 mg at 05/22/24 1422    Magnesium Standard Dose Replacement - Follow Nurse / BPA Driven Protocol, , Does not apply, PRN, Lesia Perea MD    metoprolol tartrate (LOPRESSOR) injection 5 mg, 5 mg, Intravenous, Q6H PRN, Mariela Mckinley MD    Phosphorus Replacement - Follow Nurse / BPA Driven  Protocol, , Does not apply, Eliazar REID Kathryn E, MD    Potassium Replacement - Follow Nurse / BPA Driven Protocol, , Does not apply, Eliazar REID Kathryn E, MD    sodium chloride 0.9 % flush 10 mL, 10 mL, Intravenous, Q12H, Lesia Perea MD, 10 mL at 24 0819    sodium chloride 0.9 % flush 10 mL, 10 mL, Intravenous, Eliazar ERID Kathryn E, MD    sodium chloride 0.9 % infusion 40 mL, 40 mL, Intravenous, Eliazar REID Kathryn E, MD    Antibiotics:  Anti-Infectives (From admission, onward)      Ordered     Dose/Rate Route Frequency Start Stop    24 1517  levoFLOXacin (LEVAQUIN) 750 mg/150 mL D5W (premix) (LEVAQUIN) 750 mg        Ordering Provider: Ghassan Aparicio MD    750 mg  100 mL/hr over 90 Minutes Intravenous Every 24 Hours 24 1700 24 1659    24 0902  piperacillin-tazobactam (ZOSYN) 3.375 g IVPB in 100 mL NS MBP (CD)        Ordering Provider: Mariela Mckinley MD    3.375 g  over 30 Minutes Intravenous Once 24 1000 24 1139    24 1727  vancomycin IVPB 2000 mg in 0.9% Sodium Chloride 500 mL        Ordering Provider: Vicente Mars PA    20 mg/kg × 98.5 kg  250 mL/hr over 120 Minutes Intravenous Once 24 1745 24 2018    24 1358  cefTRIAXone (ROCEPHIN) 2,000 mg in sodium chloride 0.9 % 100 mL MBP        Ordering Provider: Vicente Mars PA    2,000 mg  200 mL/hr over 30 Minutes Intravenous Once 24 1415 24 1515    24 1358  azithromycin (ZITHROMAX) 500 mg in sodium chloride 0.9 % 250 mL IVPB-VTB        Ordering Provider: Vicente Mars PA    500 mg  over 60 Minutes Intravenous Once 24 1415 24 1639              Review of Systems:  Reports cough fatigue fever confusion hypoxia    Physical Exam:   Vital Signs  Temp (24hrs), Av.3 °F (36.8 °C), Min:98 °F (36.7 °C), Max:98.6 °F (37 °C)    Temp  Min: 98 °F (36.7 °C)  Max: 98.6 °F (37 °C)  BP  Min: 124/85  Max: 141/94  Pulse  Min: 85  Max: 106  Resp  Min:  "18  Max: 18  SpO2  Min: 90 %  Max: 94 %    GENERAL: Awake and alert, in no acute distress.  On o2 4 L  HEENT: Normocephalic, atraumatic.  PERRL. EOMI. No conjunctival injection. No icterus.  On oxygen by nasal cannula    HEART: RRR; No murmur, rubs, gallops.   LUNGS: Clear to auscultation bilaterally   ABDOMEN: Soft, nontender,   EXT:  No edema left lower lobe infiltrate  :  Without Smiley catheter.  MSK: No joint effusions or erythema  SKIN: Warm and dry without cutaneous eruptions on Inspection/palpation.    NEURO: Oriented to PPT.  Motor 5/5 strength  PSYCHIATRIC: Normal insight and judgment. Cooperative with PE    Laboratory Data    Results from last 7 days   Lab Units 05/21/24  0602 05/20/24  0924 05/19/24  1404   WBC 10*3/mm3 8.68 10.22 10.27   HEMOGLOBIN g/dL 10.8* 11.7* 12.0   HEMATOCRIT % 31.7* 34.9 34.1   PLATELETS 10*3/mm3 118* 133* 153     Results from last 7 days   Lab Units 05/22/24  0249   SODIUM mmol/L 132*   POTASSIUM mmol/L 4.5  4.5   CHLORIDE mmol/L 99   CO2 mmol/L 27.0   BUN mg/dL 12   CREATININE mg/dL 0.92   GLUCOSE mg/dL 115*   CALCIUM mg/dL 7.9*     Results from last 7 days   Lab Units 05/22/24  0249   ALK PHOS U/L 62   BILIRUBIN mg/dL 0.4   ALT (SGPT) U/L 85*   AST (SGOT) U/L 160*     Results from last 7 days   Lab Units 05/19/24  1404   SED RATE mm/hr 64*     Results from last 7 days   Lab Units 05/19/24  1404   CRP mg/dL 22.50*     Results from last 7 days   Lab Units 05/19/24  1804   LACTATE mmol/L 1.2         Results from last 7 days   Lab Units 05/21/24  0602   VANCOMYCIN TR mcg/mL 8.20     Estimated Creatinine Clearance: 67.6 mL/min (by C-G formula based on SCr of 0.92 mg/dL).      Microbiology:  Blood Culture   Date Value Ref Range Status   05/19/2024 No growth at 24 hours  Preliminary     No results found for: \"BCIDPCR\", \"CXREFLEX\", \"CSFCX\", \"CULTURETIS\"  No results found for: \"CULTURES\", \"HSVCX\", \"URCX\"  No results found for: \"EYECULTURE\", \"GCCX\", \"HSVCULTURE\", \"LABHSV\"  No results " "found for: \"LEGIONELLA\", \"MRSACX\", \"MUMPSCX\", \"MYCOPLASCX\"  No results found for: \"NOCARDIACX\", \"STOOLCX\"  No results found for: \"THROATCX\", \"UNSTIMCULT\", \"URINECX\", \"CULTURE\", \"VZVCULTUR\"  No results found for: \"VIRALCULTU\", \"WOUNDCX\"        Radiology:  Imaging Results (Last 72 Hours)       Procedure Component Value Units Date/Time    CT Chest Without Contrast Diagnostic [[id number]] Collected: 05/20/24 1243     Updated: 05/20/24 1255    Narrative:      CT CHEST WO CONTRAST DIAGNOSTIC    Date of Exam: 5/20/2024 10:36 AM EDT    Indication: Worsening left lower lobe pneumonia with worsening effusion.  Rule out empyema.    Comparison: Chest radiograph dated 5/20/2024, CT chest dated 5/17/2024    Technique: Axial CT images were obtained of the chest without contrast administration.  Reconstructed coronal and sagittal images were also obtained. Automated exposure control and iterative construction methods were used.    Findings:  The visualized soft tissue structures at the base of the neck including the thyroid appear within normal limits. There is no lower cervical or axillary adenopathy.    The heart size is normal. There is no pericardial effusion. The aorta is normal in caliber without evidence of aneurysm formation. There is mild dilation of the main pulmonary artery. There is a partially calcified lower right paratracheal lymph node and   reactive mediastinal and left hilar lymph nodes likely related to acute pulmonary process.    There is dense consolidation within the left lower lobe likely representing pneumonia. There is only a small left-sided pleural effusion. This is predominantly within the posterior superior aspect of the left hemithorax. There is no abnormal pleural   thickening. There is a small amount of dependent atelectasis within the left upper lobe and right lower lobe. There is no evidence of pneumothorax.    The esophagus is normal in course and caliber. Visualized portions of the upper abdomen " demonstrate no acute findings. There are multilevel degenerative changes of the thoracic spine. No suspicious lytic or sclerotic osseous lesion within the thorax.      Impression:      Impression:  1. Dense left lower lobe consolidation with air bronchograms compatible with left lower lobe pneumonia.  2. Small left-sided pleural effusion which is predominantly posterior and superior within the left hemithorax. There is no associated pleural thickening or clear organization.        Electronically Signed: Aidan Gallego    5/20/2024 12:52 PM EDT    Workstation ID: [id number]    XR Chest 1 View [[id number]] Collected: 05/20/24 0712     Updated: 05/20/24 0716    Narrative:      XR CHEST 1 VW    Date of Exam: 5/20/2024 5:33 AM EDT    Indication: DYSPNEA, CARDIAC ORIGIN SUSPECTED  eval for effusion, dense pneumonia  hypoxia    Comparison: 5/19/2024    Findings:  The trachea is midline. There is stable appearance of the cardiopulmonary silhouette. There is interval increase in left lower lobe consolidation with moderate effusion. The right lung is clear      Impression:      Impression:  Interval increase in left lower lobe opacity compatible with consolidation and moderate effusion      Electronically Signed: Shaq Navarro MD    5/20/2024 7:13 AM EDT    Workstation ID: [id number]              Impression:   Left lower lobe infiltrate  Legionella pneumonia  Fever  Lactic acidosis concerning for tissue perfusion mismatch  Hyponatremia  Cough  Fatigue  PLAN/RECOMMENDATIONS:   Thank you for asking us to see Love Mackenzie, I recommend the following:    Patient has not surprisingly worsened since discharge from [facility] emergency room on a cephalosporin.    Drug of choice will be either levofloxacin or azithromycin.  Tetracyclines are also an option for patients with severe allergies or intolerances to the first 2 antibiotics    Assuming the patient is on immunocompromise should respond to 7 to 10 days of  therapy      cont levaquin 750 mg iv daily     D/w hospitalist    Follow o2 requirements  Ghassan Aparicio MD  5/22/2024  17:19 EDT

## 2024-05-22 NOTE — PROGRESS NOTES
[facility] Medicine Services  PROGRESS NOTE    Patient Name: Love Mackenzie  : 1950  MRN: [id number]    Date of Admission: 2024  Primary Care Physician: Silvia Garcia MD    Subjective   Subjective     CC: Hypoxia    HPI:  Patient was tachycardic last evening.  EKG showed sinus tachycardia with multiple PVCs.  Patient was having low-grade fever.  Potassium and magnesium borderline.  Replaced.  Heart rate this morning in 80s to 90s.  Afebrile.  On 5 L of nasal cannula.  Blood pressure within normal.  ID switched azithromycin to Levaquin.  Echo ordered but was not done due to tachycardia.  Patient was having  diarrhea.  3-4 times.  Denies abdominal pain or bowel movement.  C. difficile negative.      Objective   Objective     Vital Signs:   Temp:  [98 °F (36.7 °C)-99.9 °F (37.7 °C)] 98.3 °F (36.8 °C)  Heart Rate:  [] 85  Resp:  [18] 18  BP: (124-146)/(83-91) 139/89  Flow (L/min):  [5] 5     Physical Exam:  Constitutional: Awake, alert, interactive and flat affect  Eyes: clear sclerae, no conjunctival injection  HENT: NCAT, mucous membranes dry  Neck: no masses or lymphadenopathy, trachea midline  Respiratory: diminished breath sounds, splinting, occ wheezing  Cardiovascular: RRR, no murmurs appreciated, palpable peripheral pulses  Abdomen:  soft, no HSM or masses palpable, not tender or distended  Musculoskeletal: No peripheral edema, clubbing or cyanosis  Neurologic:                   Strength symmetric in all extremities                     Cranial Nerves grossly intact, speech clear  Skin: No rashes or jaundice  Psychiatric: simple insight    Results Reviewed:  LAB RESULTS:      Lab 24  0602 24  0924 24  1804 24  1404 24  0632 24  0117 24  2206   WBC 8.68 10.22  --  10.27 10.63 11.71*  --    HEMOGLOBIN 10.8* 11.7*  --  12.0 11.8* 11.0*  --    HEMATOCRIT 31.7* 34.9  --  34.1 34.3 32.0*  --    PLATELETS 118* 133*   --  153 139* 136*  --    NEUTROS ABS 7.05* 8.91*  --  8.82* 9.87* 10.81*  --    IMMATURE GRANS (ABS) 0.21* 0.17*  --  0.14* 0.04 0.03  --    LYMPHS ABS 0.80 0.71  --  0.48* 0.37* 0.36*  --    MONOS ABS 0.59 0.40  --  0.82 0.34 0.50  --    EOS ABS 0.00 0.00  --  0.00 0.00 0.00  --    MCV 86.8 87.7  --  86.1 88.9 88.9  --    SED RATE  --   --   --  64*  --   --  56*   CRP  --   --   --  22.50*  --   --  32.10*   PROCALCITONIN 0.99*  --   --  0.84* 1.34* 1.22* 1.23*   LACTATE  --   --  1.2 3.1*  --  0.8 1.8         Lab 05/22/24  0249 05/21/24  1625 05/21/24  0602 05/20/24  0924 05/19/24  1404 05/18/24  0632 05/18/24  0117 05/17/24  2205 05/17/24  1226   SODIUM 132*  --  129* 132* 130* 134*   < > 126* 129*   POTASSIUM 4.5  4.5 3.5 3.2* 3.7 3.3* 3.7   < > 3.6 4.5   CHLORIDE 99  --  93* 96* 94* 103   < > 91* 95*   CO2 27.0  --  26.0 25.0 20.3* 18.5*   < > 17.8* 20.2*   ANION GAP 6.0  --  10.0 11.0 15.7* 12.5   < > 17.2* 13.8   BUN 12  --  7* 8 13 17   < > 22 22   CREATININE 0.92  --  0.55* 0.69 0.70 0.58   < > 0.85 0.92   EGFR 66.7  --  98.1 92.9 92.6 96.9   < > 73.4 66.7   GLUCOSE 115*  --  95 124* 126* 153*   < > 120* 123*   CALCIUM 7.9*  --  7.1* 7.6* 8.4* 8.1*   < > 8.8 8.7   MAGNESIUM 2.4  --  1.9  --  2.0  --   --  2.2  --    PHOSPHORUS 2.5  --   --   --   --   --   --   --   --    TSH  --   --   --   --   --   --   --   --  1.610    < > = values in this interval not displayed.         Lab 05/22/24  0249 05/21/24  0602 05/20/24  0924 05/19/24  1404 05/18/24  0632   TOTAL PROTEIN 5.1* 4.4* 4.9* 6.2 6.2   ALBUMIN 2.3* 2.4* 2.9* 3.3* 3.3*   GLOBULIN 2.8 2.0 2.0 2.9 2.9   ALT (SGPT) 85* 41* 51* 53* 28   AST (SGOT) 160* 74* 76* 77* 35*   BILIRUBIN 0.4 0.5 0.4 0.3 0.3   ALK PHOS 62 58 66 77 68         Lab 05/19/24  1621 05/19/24  1404 05/17/24  2206 05/17/24  2205   PROBNP  --  2,771.0* 351.0  --    HSTROP T 71* 76*  --  18*             Lab 05/17/24  1226   FOLATE 19.10   VITAMIN B 12 637         Lab 05/19/24  1439  05/17/24 2224   PH, ARTERIAL 7.532*  --    PCO2, ARTERIAL 28.2*  --    PO2 ART 52.9*  --    FIO2 28 28   HCO3 ART 23.6  --    BASE EXCESS ART 1.7  --    CARBOXYHEMOGLOBIN 0.7  --    CARBOXYHEMOGLOBIN (VENOUS)  --  1.1     Brief Urine Lab Results  (Last result in the past 365 days)        Color   Clarity   Blood   Leuk Est   Nitrite   Protein   CREAT   Urine HCG        05/19/24 2118 Yellow   Cloudy   Small (1+)   Negative   Negative   Trace                   Microbiology Results Abnormal       Procedure Component Value - Date/Time    Clostridioides difficile Toxin - Stool, Per Rectum [[id number]]  (Normal) Collected: 05/22/24 0058    Lab Status: Final result Specimen: Stool from Per Rectum Updated: 05/22/24 0738    Narrative:      The following orders were created for panel order Clostridioides difficile Toxin - Stool, Per Rectum.  Procedure                               Abnormality         Status                     ---------                               -----------         ------                     Clostridioides difficile...[[id number]]  Normal              Final result                 Please view results for these tests on the individual orders.    Clostridioides difficile Toxin, PCR - Stool, Per Rectum [[id number]]  (Normal) Collected: 05/22/24 0058    Lab Status: Final result Specimen: Stool from Per Rectum Updated: 05/22/24 0738     Toxigenic C. difficile by PCR Not Detected    Narrative:      The result indicates the absence of toxigenic C. difficile from stool specimen.     Blood Culture - Blood, Hand, Right [[id number]]  (Normal) Collected: 05/19/24 1400    Lab Status: Preliminary result Specimen: Blood from Hand, Right Updated: 05/21/24 2045     Blood Culture No growth at 2 days    Blood Culture - Blood, Hand, Left [[id number]]  (Normal) Collected: 05/19/24 1405    Lab Status: Preliminary result Specimen: Blood from Hand, Left Updated: 05/21/24 2030     Blood Culture No growth at 2 days    S. Pneumo Ag Urine  or CSF - Urine, Urine, Clean Catch [[id number]]  (Normal) Collected: 05/19/24 1601    Lab Status: Final result Specimen: Urine, Clean Catch Updated: 05/20/24 1113     Strep Pneumo Ag Negative    Respiratory Panel PCR w/COVID-19(SARS-CoV-2) BACILIO/VALERI/NAHED/PAD/COR/MEÑO In-House, NP Swab in UTM/VTM, 2 HR TAT - Swab, Nasopharynx [[id number]]  (Normal) Collected: 05/20/24 0938    Lab Status: Final result Specimen: Swab from Nasopharynx Updated: 05/20/24 1039     ADENOVIRUS, PCR Not Detected     Coronavirus 229E Not Detected     Coronavirus HKU1 Not Detected     Coronavirus NL63 Not Detected     Coronavirus OC43 Not Detected     COVID19 Not Detected     Human Metapneumovirus Not Detected     Human Rhinovirus/Enterovirus Not Detected     Influenza A PCR Not Detected     Influenza B PCR Not Detected     Parainfluenza Virus 1 Not Detected     Parainfluenza Virus 2 Not Detected     Parainfluenza Virus 3 Not Detected     Parainfluenza Virus 4 Not Detected     RSV, PCR Not Detected     Bordetella pertussis pcr Not Detected     Bordetella parapertussis PCR Not Detected     Chlamydophila pneumoniae PCR Not Detected     Mycoplasma pneumo by PCR Not Detected    Narrative:      In the setting of a positive respiratory panel with a viral infection PLUS a negative procalcitonin without other underlying concern for bacterial infection, consider observing off antibiotics or discontinuation of antibiotics and continue supportive care. If the respiratory panel is positive for atypical bacterial infection (Bordetella pertussis, Chlamydophila pneumoniae, or Mycoplasma pneumoniae), consider antibiotic de-escalation to target atypical bacterial infection.    MRSA Screen, PCR (Inpatient) - Swab, Nares [[id number]]  (Normal) Collected: 05/20/24 0547    Lab Status: Final result Specimen: Swab from Nares Updated: 05/20/24 0831     MRSA PCR Negative    Narrative:      The negative predictive value of this diagnostic test is high and should only be used  to consider de-escalating anti-MRSA therapy. A positive result may indicate colonization with MRSA and must be correlated clinically.  MRSA Negative            CT Chest Without Contrast Diagnostic    Result Date: 5/20/2024  CT CHEST WO CONTRAST DIAGNOSTIC Date of Exam: 5/20/2024 10:36 AM EDT Indication: Worsening left lower lobe pneumonia with worsening effusion.  Rule out empyema. Comparison: Chest radiograph dated 5/20/2024, CT chest dated 5/17/2024 Technique: Axial CT images were obtained of the chest without contrast administration.  Reconstructed coronal and sagittal images were also obtained. Automated exposure control and iterative construction methods were used. Findings: The visualized soft tissue structures at the base of the neck including the thyroid appear within normal limits. There is no lower cervical or axillary adenopathy. The heart size is normal. There is no pericardial effusion. The aorta is normal in caliber without evidence of aneurysm formation. There is mild dilation of the main pulmonary artery. There is a partially calcified lower right paratracheal lymph node and  reactive mediastinal and left hilar lymph nodes likely related to acute pulmonary process. There is dense consolidation within the left lower lobe likely representing pneumonia. There is only a small left-sided pleural effusion. This is predominantly within the posterior superior aspect of the left hemithorax. There is no abnormal pleural thickening. There is a small amount of dependent atelectasis within the left upper lobe and right lower lobe. There is no evidence of pneumothorax. The esophagus is normal in course and caliber. Visualized portions of the upper abdomen demonstrate no acute findings. There are multilevel degenerative changes of the thoracic spine. No suspicious lytic or sclerotic osseous lesion within the thorax.     Impression: Impression: 1. Dense left lower lobe consolidation with air bronchograms compatible  with left lower lobe pneumonia. 2. Small left-sided pleural effusion which is predominantly posterior and superior within the left hemithorax. There is no associated pleural thickening or clear organization. Electronically Signed: Aidan Lashonda  5/20/2024 12:52 PM EDT  Workstation ID: [id number]         Current medications:  Scheduled Meds:atorvastatin, 10 mg, Oral, Nightly  enoxaparin, 40 mg, Subcutaneous, Daily  guaiFENesin, 600 mg, Oral, Q12H  levoFLOXacin, 750 mg, Intravenous, Q24H  sodium chloride, 10 mL, Intravenous, Q12H      Continuous Infusions:lactated ringers, 75 mL/hr, Last Rate: 75 mL/hr (05/22/24 0644)      PRN Meds:.  acetaminophen    senna-docusate sodium **AND** polyethylene glycol **AND** bisacodyl **AND** bisacodyl    Calcium Replacement - Follow Nurse / BPA Driven Protocol    Hydrocod Brian-Chlorphe Brian ER    Magnesium Standard Dose Replacement - Follow Nurse / BPA Driven Protocol    metoprolol tartrate    Phosphorus Replacement - Follow Nurse / BPA Driven Protocol    Potassium Replacement - Follow Nurse / BPA Driven Protocol    sodium chloride    sodium chloride    Assessment & Plan   Assessment & Plan     Active Hospital Problems    Diagnosis  POA    Hypoxia [R09.02]  Yes    Left lower lobe pneumonia [J18.9]  Yes    Benign essential HTN [I10]  Yes      Resolved Hospital Problems    Diagnosis Date Resolved POA    **Pneumonia [J18.9] 05/19/2024 Yes        Brief Hospital Course to date:  Love Mackenzie is a 73 y.o. female who  has a past medical history of Arthritis and Hyperlipidemia. who presented to [place] ED on 5/17 with cough and fever, she stayed overnight and DCd home 5.18. She returned today 5/19 with increasing confusion, was found to have a temp pg 1-34 and to be hypoxic. She was sent to [facility] for admission.     Acute hypoxic respiratory failure:  Community-acquired pneumonia with left lower lobe infiltration secondary to Legionella infection  Worsening left lower lobe effusion.     Elevated Pro-[place] and inflammatory markers   -Patient was initially in [facility] ED on the 17th.  She received 2 days of IV cephalosporin then she was discharged home on oral antibiotic.  -Came back with worsening symptoms.  -Patient was requiring 5 to 6 L of oxygen.  -Repeated chest x-ray showednterval increase in left lower lobe opacity compatible with consolidation and moderate effusion.  -Vancomycin added.  Rocephin switched to Zosyn.  Add azithromycin to cover atypical bacteria on 05/20.  Plan:  Afebrile. Patient on 5 L this morning down from 6 L yesterday.  Repeated CT chest showed dense left lower lobe consolidation compatible with left lower lobe pneumonia and small left sided pleural effusion without loculation.  Urine antigen is positive for Legionella.  Patient was on azithromycin 500 mg.  ID consulted.  Switch her to Levaquin.  Monitor QTc.  Elevated BNP.  Lasix 40 mg IV ordered.  Echo pending.    Mild hyponatremia: Likely secondary to Legionella infection.  -Monitor with daily BMP    Acute cardio with multiple PVCs/fusion complex?  Likely secondary to sepsis  Elevated troponin likely demand secondary to the above.  Elevated proBNP  HLP  -EKG showed normal sinus rhythm with frequent PVCs.  No acute ischemic changes.  -Troponin flat 70s.  -Echo ordered.  -cont statin  -All the above is likely secondary to sepsis and respiratory failure.  -Elevated BNP.  Will give a dose of Lasix.  -Replace electrolytes as needed.  Potassium more than 4.  Magnesium more than 2.      Acute diarrhea.  Likely secondary to antibiotics  Patient was having  diarrhea.  3-4 times.   Denies abdominal pain or bowel movement.  C. difficile negative.  Imodium started on 05/22          Expected Discharge Location and Transportation: TBD  Expected Discharge   Expected Discharge Date: 5/23/2024; Expected Discharge Time:      DVT prophylaxis:  Medical DVT prophylaxis orders are present.         AM-PAC 6 Clicks Score (PT): 20 (05/21/24  2013)    CODE STATUS:   Code Status and Medical Interventions:   Ordered at: 05/19/24 2202     Code Status (Patient has no pulse and is not breathing):    CPR (Attempt to Resuscitate)     Medical Interventions (Patient has pulse or is breathing):    Full Support       Mariela Mckinley MD  05/22/24

## 2024-05-23 ENCOUNTER — READMISSION MANAGEMENT (OUTPATIENT)
Dept: CALL CENTER | Facility: HOSPITAL | Age: 74
End: 2024-05-23
Payer: MEDICARE

## 2024-05-23 VITALS
HEART RATE: 94 BPM | TEMPERATURE: 98 F | DIASTOLIC BLOOD PRESSURE: 97 MMHG | RESPIRATION RATE: 18 BRPM | OXYGEN SATURATION: 93 % | BODY MASS INDEX: 36.16 KG/M2 | HEIGHT: 66 IN | WEIGHT: 225 LBS | SYSTOLIC BLOOD PRESSURE: 139 MMHG

## 2024-05-23 PROBLEM — A41.9 SEVERE SEPSIS WITH ACUTE ORGAN DYSFUNCTION: Status: ACTIVE | Noted: 2024-05-23

## 2024-05-23 PROBLEM — R65.20 SEVERE SEPSIS WITH ACUTE ORGAN DYSFUNCTION: Status: ACTIVE | Noted: 2024-05-23

## 2024-05-23 PROBLEM — J96.01 ACUTE RESPIRATORY FAILURE WITH HYPOXIA: Status: ACTIVE | Noted: 2024-05-23

## 2024-05-23 LAB
ALBUMIN SERPL-MCNC: 2.7 G/DL (ref 3.5–5.2)
ALBUMIN/GLOB SERPL: 1.3 G/DL
ALP SERPL-CCNC: 72 U/L (ref 39–117)
ALT SERPL W P-5'-P-CCNC: 118 U/L (ref 1–33)
ANION GAP SERPL CALCULATED.3IONS-SCNC: 9 MMOL/L (ref 5–15)
AST SERPL-CCNC: 170 U/L (ref 1–32)
BACTERIA SPEC AEROBE CULT: NORMAL
BACTERIA SPEC AEROBE CULT: NORMAL
BILIRUB SERPL-MCNC: 0.5 MG/DL (ref 0–1.2)
BUN SERPL-MCNC: 15 MG/DL (ref 8–23)
BUN/CREAT SERPL: 16.3 (ref 7–25)
CALCIUM SPEC-SCNC: 8 MG/DL (ref 8.6–10.5)
CHLORIDE SERPL-SCNC: 99 MMOL/L (ref 98–107)
CO2 SERPL-SCNC: 30 MMOL/L (ref 22–29)
CREAT SERPL-MCNC: 0.92 MG/DL (ref 0.57–1)
EGFRCR SERPLBLD CKD-EPI 2021: 66.7 ML/MIN/1.73
GLOBULIN UR ELPH-MCNC: 2.1 GM/DL
GLUCOSE SERPL-MCNC: 105 MG/DL (ref 65–99)
MAGNESIUM SERPL-MCNC: 2.2 MG/DL (ref 1.6–2.4)
PHOSPHATE SERPL-MCNC: 4.2 MG/DL (ref 2.5–4.5)
POTASSIUM SERPL-SCNC: 3.8 MMOL/L (ref 3.5–5.2)
PROT SERPL-MCNC: 4.8 G/DL (ref 6–8.5)
SODIUM SERPL-SCNC: 138 MMOL/L (ref 136–145)

## 2024-05-23 PROCEDURE — 93005 ELECTROCARDIOGRAM TRACING: CPT

## 2024-05-23 PROCEDURE — 93010 ELECTROCARDIOGRAM REPORT: CPT | Performed by: INTERNAL MEDICINE

## 2024-05-23 PROCEDURE — 97116 GAIT TRAINING THERAPY: CPT

## 2024-05-23 PROCEDURE — 99239 HOSP IP/OBS DSCHRG MGMT >30: CPT | Performed by: HOSPITALIST

## 2024-05-23 PROCEDURE — 94668 MNPJ CHEST WALL SBSQ: CPT

## 2024-05-23 PROCEDURE — 84100 ASSAY OF PHOSPHORUS: CPT | Performed by: HOSPITALIST

## 2024-05-23 PROCEDURE — 25010000002 ENOXAPARIN PER 10 MG: Performed by: INTERNAL MEDICINE

## 2024-05-23 PROCEDURE — 80053 COMPREHEN METABOLIC PANEL: CPT | Performed by: HOSPITALIST

## 2024-05-23 PROCEDURE — 83735 ASSAY OF MAGNESIUM: CPT | Performed by: HOSPITALIST

## 2024-05-23 PROCEDURE — 97110 THERAPEUTIC EXERCISES: CPT

## 2024-05-23 RX ORDER — LEVOFLOXACIN 750 MG/1
750 TABLET, FILM COATED ORAL DAILY
Qty: 5 TABLET | Refills: 0 | Status: SHIPPED | OUTPATIENT
Start: 2024-05-24 | End: 2024-05-30

## 2024-05-23 RX ORDER — GUAIFENESIN 600 MG/1
600 TABLET, EXTENDED RELEASE ORAL EVERY 12 HOURS SCHEDULED
Qty: 30 TABLET | Refills: 0 | Status: SHIPPED | OUTPATIENT
Start: 2024-05-23 | End: 2024-06-07

## 2024-05-23 RX ADMIN — LOPERAMIDE HYDROCHLORIDE 2 MG: 2 CAPSULE ORAL at 09:05

## 2024-05-23 RX ADMIN — ENOXAPARIN SODIUM 40 MG: 100 INJECTION SUBCUTANEOUS at 09:04

## 2024-05-23 RX ADMIN — GUAIFENESIN 600 MG: 600 TABLET, EXTENDED RELEASE ORAL at 09:05

## 2024-05-23 NOTE — DISCHARGE SUMMARY
[facility] Medicine Services  DISCHARGE SUMMARY    Patient Name: Love Mackenzie  : 1950  MRN: [id number]    Date of Admission: 2024  1:31 PM  Date of Discharge: 2024  Primary Care Physician: Silvia Garcia MD    Consults       Date and Time Order Name Status Description    2024  9:32 AM Inpatient Infectious Diseases Consult Completed             Hospital Course     Presenting Problem: Pneumonia    Active Hospital Problems    Diagnosis  POA    Acute respiratory failure with hypoxia [J96.01]  Unknown    Severe sepsis with acute organ dysfunction [A41.9, R65.20]  Unknown    Hypoxia [R09.02]  Yes    Left lower lobe pneumonia [J18.9]  Yes    Benign essential HTN [I10]  Yes      Resolved Hospital Problems    Diagnosis Date Resolved POA    **Pneumonia [J18.9] 2024 Yes          Hospital Course:  Love Mackenzie is a 73 y.o. female who  has a past medical history of Arthritis and Hyperlipidemia. who presented to [place] ED on  with cough and fever, she stayed overnight and DCd home . She returned today  with increasing confusion, was found to have a temp pg 1-34 and to be hypoxic. She was sent to [facility] for admission.      Acute hypoxic respiratory failure: Secondary to #2  Community-acquired pneumonia with left lower lobe infiltration secondary to Legionella infection  Worsening left lower lobe effusion.    Elevated Pro-[place] and inflammatory markers   -Patient was initially in [facility] ED on the .  She received 2 days of IV cephalosporin then she was discharged home on oral antibiotic.  -Came back with worsening symptoms.  -Patient was requiring 5 to 6 L of oxygen.  -Repeated chest x-ray showednterval increase in left lower lobe opacity compatible with consolidation and moderate effusion.  -Vancomycin added.  Rocephin switched to Zosyn.  Add azithromycin to cover atypical bacteria on .   Repeated CT chest showed dense left lower  lobe consolidation compatible with left lower lobe pneumonia and small left sided pleural effusion without loculation.  Urine antigen is positive for Legionella.   -  Patient was on azithromycin 500 mg.  ID consulted.  Switch her to Levaquin.  Elevated BNP.  Lasix 40 mg IV ordered.  Echo with normal EF.  No comment on diastolic dysfunction.  Patient was on 6 L of nasal cannula weaned off to 2 L than room air.   -Discharge patient on Levaquin for total of 7 days.     Mild hyponatremia: Likely secondary to Legionella infection.  Resolved.     Sinus tachycardia with multiple PVCs/fusion complex?  Likely secondary to sepsis.  Improved  Elevated troponin likely demand secondary to the above.  Elevated proBNP  HLP  -EKG showed normal sinus rhythm with frequent PVCs.  No acute ischemic changes.  -Troponin flat 70s.  -Echo as above  -All the above is likely secondary to sepsis and respiratory failure.       Acute diarrhea.  Likely secondary to antibiotics.  Resolved  Patient was having  diarrhea.  3-4 times.   Denies abdominal pain or bowel movement.  C. difficile negative.  Imodium started on 05/22     Elevated transaminase: Likely secondary to sepsis/antibiotics  -AST more than ALT  -No right upper quadrant pain.  -CMP within a week.  -Consider liver ultrasound if no improvement.  As an outpatient  -Hold statin and Tylenol on discharge.      Discharge Follow Up Recommendations for outpatient labs/diagnostics:  Follow-up with PCP in 1 week with CMP.    Day of Discharge     HPI:   Was seen and examined today.  We were able to wean her off nasal cannula.  Ambulated fine without oxygen needs.  LFTs mildly elevated.  Benign abdominal exam.  Plan was to keep the patient 1 more day to monitor liver function but patient wants to go home today.  She will follow-up with her PCP within 1 week with repeated blood work.  She voiced understanding.    Review of Systems  No fever, chills, chest pain, abdominal pain or further  katerinahe.Vital Signs:   Temp:  [98 °F (36.7 °C)-99.6 °F (37.6 °C)] 98 °F (36.7 °C)  Heart Rate:  [] 86  Resp:  [16-18] 18  BP: (137-162)/(85-97) 139/97  Flow (L/min):  [2] 2      Physical Exam:  Constitutional: Awake, alert, interactive and flat affect  Eyes: clear sclerae, no conjunctival injection  HENT: NCAT, mucous membranes dry  Neck: no masses or lymphadenopathy, trachea midline  Respiratory: diminished breath sounds, splinting,  Cardiovascular: RRR, no murmurs appreciated, palpable peripheral pulses  Abdomen:  soft, no HSM or masses palpable, not tender or distended  Musculoskeletal: No peripheral edema, clubbing or cyanosis  Neurologic:                   Strength symmetric in all extremities                     Cranial Nerves grossly intact, speech clear  Skin: No rashes or jaundice  Psychiatric: simple insight    Pertinent  and/or Most Recent Results     LAB RESULTS:      Lab 05/21/24  0602 05/20/24  0924 05/19/24  1804 05/19/24  1404 05/18/24  0632 05/18/24  0117 05/17/24  2206   WBC 8.68 10.22  --  10.27 10.63 11.71*  --    HEMOGLOBIN 10.8* 11.7*  --  12.0 11.8* 11.0*  --    HEMATOCRIT 31.7* 34.9  --  34.1 34.3 32.0*  --    PLATELETS 118* 133*  --  153 139* 136*  --    NEUTROS ABS 7.05* 8.91*  --  8.82* 9.87* 10.81*  --    IMMATURE GRANS (ABS) 0.21* 0.17*  --  0.14* 0.04 0.03  --    LYMPHS ABS 0.80 0.71  --  0.48* 0.37* 0.36*  --    MONOS ABS 0.59 0.40  --  0.82 0.34 0.50  --    EOS ABS 0.00 0.00  --  0.00 0.00 0.00  --    MCV 86.8 87.7  --  86.1 88.9 88.9  --    SED RATE  --   --   --  64*  --   --  56*   CRP  --   --   --  22.50*  --   --  32.10*   PROCALCITONIN 0.99*  --   --  0.84* 1.34* 1.22* 1.23*   LACTATE  --   --  1.2 3.1*  --  0.8 1.8         Lab 05/23/24  0618 05/22/24  0249 05/21/24  1625 05/21/24  0602 05/20/24  0924 05/19/24  1404 05/18/24  0117 05/17/24  2205 05/17/24  1226   SODIUM 138 132*  --  129* 132* 130*   < > 126* 129*   POTASSIUM 3.8 4.5  4.5 3.5 3.2* 3.7 3.3*   < > 3.6 4.5    CHLORIDE 99 99  --  93* 96* 94*   < > 91* 95*   CO2 30.0* 27.0  --  26.0 25.0 20.3*   < > 17.8* 20.2*   ANION GAP 9.0 6.0  --  10.0 11.0 15.7*   < > 17.2* 13.8   BUN 15 12  --  7* 8 13   < > 22 22   CREATININE 0.92 0.92  --  0.55* 0.69 0.70   < > 0.85 0.92   EGFR 66.7 66.7  --  98.1 92.9 92.6   < > 73.4 66.7   GLUCOSE 105* 115*  --  95 124* 126*   < > 120* 123*   CALCIUM 8.0* 7.9*  --  7.1* 7.6* 8.4*   < > 8.8 8.7   MAGNESIUM 2.2 2.4  --  1.9  --  2.0  --  2.2  --    PHOSPHORUS 4.2 2.5  --   --   --   --   --   --   --    TSH  --   --   --   --   --   --   --   --  1.610    < > = values in this interval not displayed.         Lab 05/23/24  0618 05/22/24  0249 05/21/24  0602 05/20/24  0924 05/19/24  1404   TOTAL PROTEIN 4.8* 5.1* 4.4* 4.9* 6.2   ALBUMIN 2.7* 2.3* 2.4* 2.9* 3.3*   GLOBULIN 2.1 2.8 2.0 2.0 2.9   ALT (SGPT) 118* 85* 41* 51* 53*   AST (SGOT) 170* 160* 74* 76* 77*   BILIRUBIN 0.5 0.4 0.5 0.4 0.3   ALK PHOS 72 62 58 66 77         Lab 05/19/24  1621 05/19/24  1404 05/17/24  2206 05/17/24  2205   PROBNP  --  2,771.0* 351.0  --    HSTROP T 71* 76*  --  18*             Lab 05/17/24  1226   FOLATE 19.10   VITAMIN B 12 637         Lab 05/19/24  1439 05/17/24  2224   PH, ARTERIAL 7.532*  --    PCO2, ARTERIAL 28.2*  --    PO2 ART 52.9*  --    FIO2 28 28   HCO3 ART 23.6  --    BASE EXCESS ART 1.7  --    CARBOXYHEMOGLOBIN 0.7  --    CARBOXYHEMOGLOBIN (VENOUS)  --  1.1     Brief Urine Lab Results  (Last result in the past 365 days)        Color   Clarity   Blood   Leuk Est   Nitrite   Protein   CREAT   Urine HCG        05/19/24 2118 Yellow   Cloudy   Small (1+)   Negative   Negative   Trace                 Microbiology Results (last 10 days)       Procedure Component Value - Date/Time    Clostridioides difficile Toxin - Stool, Per Rectum [[id number]]  (Normal) Collected: 05/22/24 0058    Lab Status: Final result Specimen: Stool from Per Rectum Updated: 05/22/24 0738    Narrative:      The following orders were  created for panel order Clostridioides difficile Toxin - Stool, Per Rectum.  Procedure                               Abnormality         Status                     ---------                               -----------         ------                     Clostridioides difficile...[[id number]]  Normal              Final result                 Please view results for these tests on the individual orders.    Clostridioides difficile Toxin, PCR - Stool, Per Rectum [[id number]]  (Normal) Collected: 05/22/24 0058    Lab Status: Final result Specimen: Stool from Per Rectum Updated: 05/22/24 0738     Toxigenic C. difficile by PCR Not Detected    Narrative:      The result indicates the absence of toxigenic C. difficile from stool specimen.     Respiratory Panel PCR w/COVID-19(SARS-CoV-2) [facility]ACILIO/[facility]/NAHED/PAD/COR/MEÑO In-House, NP Swab in UTM/VTM, 2 HR TAT - Swab, Nasopharynx [[id number]]  (Normal) Collected: 05/20/24 0938    Lab Status: Final result Specimen: Swab from Nasopharynx Updated: 05/20/24 1039     ADENOVIRUS, PCR Not Detected     Coronavirus 229E Not Detected     Coronavirus HKU1 Not Detected     Coronavirus NL63 Not Detected     Coronavirus OC43 Not Detected     COVID19 Not Detected     Human Metapneumovirus Not Detected     Human Rhinovirus/Enterovirus Not Detected     Influenza A PCR Not Detected     Influenza B PCR Not Detected     Parainfluenza Virus 1 Not Detected     Parainfluenza Virus 2 Not Detected     Parainfluenza Virus 3 Not Detected     Parainfluenza Virus 4 Not Detected     RSV, PCR Not Detected     Bordetella pertussis pcr Not Detected     Bordetella parapertussis PCR Not Detected     Chlamydophila pneumoniae PCR Not Detected     Mycoplasma pneumo by PCR Not Detected    Narrative:      In the setting of a positive respiratory panel with a viral infection PLUS a negative procalcitonin without other underlying concern for bacterial infection, consider observing off antibiotics or discontinuation of antibiotics  and continue supportive care. If the respiratory panel is positive for atypical bacterial infection (Bordetella pertussis, Chlamydophila pneumoniae, or Mycoplasma pneumoniae), consider antibiotic de-escalation to target atypical bacterial infection.    MRSA Screen, PCR (Inpatient) - Swab, Nares [[id number]]  (Normal) Collected: 05/20/24 0547    Lab Status: Final result Specimen: Swab from Nares Updated: 05/20/24 0831     MRSA PCR Negative    Narrative:      The negative predictive value of this diagnostic test is high and should only be used to consider de-escalating anti-MRSA therapy. A positive result may indicate colonization with MRSA and must be correlated clinically.  MRSA Negative    S. Pneumo Ag Urine or CSF - Urine, Urine, Clean Catch [[id number]]  (Normal) Collected: 05/19/24 1601    Lab Status: Final result Specimen: Urine, Clean Catch Updated: 05/20/24 1113     Strep Pneumo Ag Negative    Legionella Antigen, Urine - Urine, Urine, Clean Catch [[id number]]  (Abnormal) Collected: 05/19/24 1601    Lab Status: Final result Specimen: Urine, Clean Catch Updated: 05/20/24 1113     LEGIONELLA ANTIGEN, URINE Positive    Blood Culture - Blood, Hand, Left [[id number]]  (Normal) Collected: 05/19/24 1405    Lab Status: Preliminary result Specimen: Blood from Hand, Left Updated: 05/22/24 2045     Blood Culture No growth at 3 days    Blood Culture - Blood, Hand, Right [[id number]]  (Normal) Collected: 05/19/24 1400    Lab Status: Preliminary result Specimen: Blood from Hand, Right Updated: 05/22/24 2045     Blood Culture No growth at 3 days    Blood Culture - Blood, Hand, Right [[id number]]  (Normal) Collected: 05/17/24 2222    Lab Status: Final result Specimen: Blood from Hand, Right Updated: 05/23/24 1000     Blood Culture No growth at 5 days    Blood Culture - Blood, Arm, Left [[id number]]  (Normal) Collected: 05/17/24 2207    Lab Status: Final result Specimen: Blood from Arm, Left Updated: 05/23/24 1000     Blood Culture  No growth at 5 days    COVID-19, FLU A/B, RSV PCR 1 HR TAT - Swab, Nasopharynx [[id number]]  (Normal) Collected: 05/17/24 2205    Lab Status: Final result Specimen: Swab from Nasopharynx Updated: 05/17/24 2305     COVID19 Not Detected     Influenza A PCR Not Detected     Influenza B PCR Not Detected     RSV, PCR Not Detected    Narrative:      Fact sheet for providers: https://www.fda.gov/media/245487/download    Fact sheet for patients: https://www.fda.gov/media/074866/download    Test performed by PCR.            Adult Transthoracic Echo Complete W/ Cont if Necessary Per Protocol    Result Date: 5/22/2024    Left ventricular systolic function is normal. Calculated left ventricular EF = 52.7% Left ventricular ejection fraction appears to be 51 - 55%.   Estimated right ventricular systolic pressure from tricuspid regurgitation is normal (<35 mmHg).   Normal left atrial size and volume noted.     CT Chest Without Contrast Diagnostic    Result Date: 5/20/2024  CT CHEST WO CONTRAST DIAGNOSTIC Date of Exam: 5/20/2024 10:36 AM EDT Indication: Worsening left lower lobe pneumonia with worsening effusion.  Rule out empyema. Comparison: Chest radiograph dated 5/20/2024, CT chest dated 5/17/2024 Technique: Axial CT images were obtained of the chest without contrast administration.  Reconstructed coronal and sagittal images were also obtained. Automated exposure control and iterative construction methods were used. Findings: The visualized soft tissue structures at the base of the neck including the thyroid appear within normal limits. There is no lower cervical or axillary adenopathy. The heart size is normal. There is no pericardial effusion. The aorta is normal in caliber without evidence of aneurysm formation. There is mild dilation of the main pulmonary artery. There is a partially calcified lower right paratracheal lymph node and  reactive mediastinal and left hilar lymph nodes likely related to acute pulmonary process.  There is dense consolidation within the left lower lobe likely representing pneumonia. There is only a small left-sided pleural effusion. This is predominantly within the posterior superior aspect of the left hemithorax. There is no abnormal pleural thickening. There is a small amount of dependent atelectasis within the left upper lobe and right lower lobe. There is no evidence of pneumothorax. The esophagus is normal in course and caliber. Visualized portions of the upper abdomen demonstrate no acute findings. There are multilevel degenerative changes of the thoracic spine. No suspicious lytic or sclerotic osseous lesion within the thorax.     Impression: 1. Dense left lower lobe consolidation with air bronchograms compatible with left lower lobe pneumonia. 2. Small left-sided pleural effusion which is predominantly posterior and superior within the left hemithorax. There is no associated pleural thickening or clear organization. Electronically Signed: Aidan Gallego  5/20/2024 12:52 PM EDT  Workstation ID: [id number]    XR Chest 1 View    Result Date: 5/20/2024  XR CHEST 1 VW Date of Exam: 5/20/2024 5:33 AM EDT Indication: DYSPNEA, CARDIAC ORIGIN SUSPECTED eval for effusion, dense pneumonia hypoxia Comparison: 5/19/2024 Findings: The trachea is midline. There is stable appearance of the cardiopulmonary silhouette. There is interval increase in left lower lobe consolidation with moderate effusion. The right lung is clear     Impression: Interval increase in left lower lobe opacity compatible with consolidation and moderate effusion Electronically Signed: Shaq Navarro MD  5/20/2024 7:13 AM EDT  Workstation ID: [id number]    XR Chest 2 View    Result Date: 5/19/2024  XR CHEST 2 VW Date of Exam: 5/19/2024 2:01 PM EDT Indication: previous pneumonia Dx, dyspnea Comparison: Chest CT 5/17/2024. Findings: Cardiomediastinal silhouette is within normal limits. Right upper lobe calcified granuloma. Confluent airspace disease  in the left lower lobe, overall similar compared to recent chest CT within the confines of modality differences. No evidence of focal consolidation otherwise. Probable trace left pleural effusion. No right pleural effusion. No pneumothorax. Osseous structures are unchanged.     Impression: Overall similar imaging appearance of patient's left lower lobe pneumonia. No evidence of new or worsening disease in the chest. Electronically Signed: Armando Garcia MD  5/19/2024 2:46 PM EDT  Workstation ID: [id number]    CT Abdomen Pelvis With Contrast    Result Date: 5/17/2024  CT ABDOMEN PELVIS W CONTRAST Date of Exam: 5/17/2024 10:53 PM EDT Indication: diarrhea. Comparison: None available. Technique: Axial CT images were obtained of the abdomen and pelvis following the uneventful intravenous administration of 90 mL Isovue-370. Reconstructed coronal and sagittal images were also obtained. Automated exposure control and iterative construction methods were used. Findings: Lung Bases:   There is left lower lobe airspace disease consistent with pneumonia. There is a trace pleural effusion on the left. Liver: Liver is normal in size and CT density. No focal lesions. Biliary/Gallbladder:  There is density change within the gallbladder which may be small stones or sludge. Spleen: Spleen is normal in size and CT density. Pancreas:  Pancreas is normal. There is no evidence of pancreatic mass or peripancreatic fluid. Kidneys:  Kidneys are normal in size. There are no stones or hydronephrosis. Adrenals:  Adrenal glands are unremarkable. Retroperitoneal/Lymph Nodes/Vasculature:  No retroperitoneal adenopathy is identified. Gastrointestinal/Mesentery:  The bowel loops are non-dilated without wall thickening or mass. There is diverticulosis without diverticulitis. The appendix appears within normal limits. No evidence of obstruction. No free air. No mesenteric fluid collections identified. Bladder:  The bladder is normal. Genital:   Patient  is status post hysterectomy.       Bony Structures:   Visualized bony structures are consistent with the patient's age.     Impression: Left lower lobe pneumonia. No acute abdominal or pelvic abnormality. Electronically Signed: Truong Perez MD  5/17/2024 11:37 PM EDT  Workstation ID: [id number]    CT Angiogram Chest Pulmonary Embolism    Result Date: 5/17/2024  CT ANGIOGRAM CHEST PULMONARY EMBOLISM Date of Exam: 5/17/2024 10:53 PM EDT Indication: hypoxic. Comparison: None available. Technique: Axial CT images were obtained of the chest after the uneventful intravenous administration of 90 mL Isovue-370 utilizing pulmonary embolism protocol.  Reconstructed coronal and sagittal images were also obtained. Automated exposure control and  iterative construction methods were used. Findings: Pulmonary arteries: Adequate opacification of the pulmonary arteries. No evidence of acute pulmonary embolism. Lungs and Pleura: There is diffuse consolidation with air bronchograms involving the left lower lobe. There is a small pleural effusion. There is no pulmonary mass. There are no suspicious pulmonary nodules. Mediastinum/Missy: No mediastinal or hilar lymphadenopathy. Lymph nodes: No axillary or supraclavicular adenopathy. Cardiovascular: The cardiac chambers are within normal limits. The pericardium is normal. The aorta and its arch branch vessels are unremarkable.   Upper Abdomen: The upper abdominal contents are unremarkable.      Bones and Soft Tissue: No suspicious osseous lesion.     Impression: Diffuse left lower lobe infiltrate with adjacent small effusion. The findings are consistent with pneumonia. There is no pulmonary embolism. Electronically Signed: Truong Perez MD  5/17/2024 11:33 PM EDT  Workstation ID: [id number]             Results for orders placed during the hospital encounter of 05/19/24    Adult Transthoracic Echo Complete W/ Cont if Necessary Per Protocol    Interpretation Summary    Left ventricular systolic  function is normal. Calculated left ventricular EF = 52.7% Left ventricular ejection fraction appears to be 51 - 55%.    Estimated right ventricular systolic pressure from tricuspid regurgitation is normal (<35 mmHg).    Normal left atrial size and volume noted.      Plan for Follow-up of Pending Labs/Results:   Pending Labs       Order Current Status    Blood Culture - Blood, Hand, Left Preliminary result    Blood Culture - Blood, Hand, Right Preliminary result          Discharge Details        Discharge Medications        New Medications        Instructions Start Date   guaiFENesin 600 MG 12 hr tablet  Commonly known as: MUCINEX   600 mg, Oral, Every 12 Hours Scheduled      levoFLOXacin 750 MG tablet  Commonly known as: Levaquin   750 mg, Oral, Daily   Start Date: May 24, 2024            Stop These Medications      atorvastatin 10 MG tablet  Commonly known as: LIPITOR     cefdinir 300 MG capsule  Commonly known as: OMNICEF              No Known Allergies      Discharge Disposition:  Home or Self Care    Diet:  Hospital:  Diet Order   Procedures    Diet: Regular/House; Fluid Consistency: Thin (IDDSI 0)            Activity: As tolerated      Restrictions or Other Recommendations:         CODE STATUS:    Code Status and Medical Interventions:   Ordered at: 05/19/24 2205     Code Status (Patient has no pulse and is not breathing):    CPR (Attempt to Resuscitate)     Medical Interventions (Patient has pulse or is breathing):    Full Support       Future Appointments   Date Time Provider Department Center   5/30/2024 10:45 AM Silvia Garcia MD [facility]   11/13/2024  9:45 AM Silvia Garcia MD [facility]                 Mariela Mckinley MD  05/23/24      Time Spent on Discharge:  I spent 35 minutes on this discharge activity which included: face-to-face encounter with the patient, reviewing the data in the system, coordination of the care with the nursing staff as well as consultants,  documentation, and entering orders.

## 2024-05-23 NOTE — OUTREACH NOTE
Prep Survey      Flowsheet Row Responses   [facility] facility patient discharged from? [facility]   Is LACE score < 7 ? No   Eligibility [facility]   Date of Admission 05/19/24   Date of Discharge 05/23/24   Discharge Disposition Home or Self Care   Discharge diagnosis Pneumonia   Does the patient have one of the following disease processes/diagnoses(primary or secondary)? Pneumonia   Does the patient have Home health ordered? Yes   What is the Home health agency?  [facility]    Is there a DME ordered? No   Prep survey completed? Yes            KENIA HOLLAND - Registered Nurse

## 2024-05-23 NOTE — THERAPY TREATMENT NOTE
Patient Name: Love Mackenzie  : 1950    MRN: [id number]                              Today's Date: 2024       Admit Date: 2024    Visit Dx:     ICD-10-CM ICD-9-CM   1. Sepsis due to pneumonia  J18.9 486    A41.9 995.91   2. Acute hypoxic respiratory failure  J96.01 518.81     Patient Active Problem List   Diagnosis    Benign essential HTN    Left lower lobe pneumonia    Hypoxia     Past Medical History:   Diagnosis Date    Arthritis     Hyperlipidemia      Past Surgical History:   Procedure Laterality Date    BREAST LUMPECTOMY      BUNIONECTOMY Left 2023    COLONOSCOPY  2013    HYSTERECTOMY  1986    LYMPH NODE BIOPSY      TONSILLECTOMY        General Information       Row Name 24 0941          Physical Therapy Time and Intention    Document Type therapy note (daily note)  -AS     Mode of Treatment physical therapy  -AS       Row Name 24 0941          General Information    Patient Profile Reviewed yes  -AS     Existing Precautions/Restrictions fall  -AS     Barriers to Rehab medically complex;hearing deficit  -AS       Row Name 24 0941          Cognition    Orientation Status (Cognition) oriented x 3  -AS       Row Name 24 0941          Safety Issues, Functional Mobility    Safety Issues Affecting Function (Mobility) awareness of need for assistance;safety precaution awareness;positioning of assistive device  -AS     Impairments Affecting Function (Mobility) balance;endurance/activity tolerance;pain;shortness of breath;strength  -AS     Comment, Safety Issues/Impairments (Mobility) alert and following commands  -AS               User Key  (r) = Recorded By, (t) = Taken By, (c) = Cosigned By      Initials Name Provider Type    AS Dana Begum PTA Physical Therapist Assistant                   Mobility       Row Name 24 0942          Bed Mobility    Comment, (Bed Mobility) UIC pre/post tx  -AS       Row Name 24 0942          Transfers    Comment,  (Transfers) cues for hand placement and keeping wlaker with when sitting in recliner  -AS       Row Name 05/23/24 0942          Sit-Stand Transfer    Sit-Stand [place] (Transfers) standby assist  -AS     Assistive Device (Sit-Stand Transfers) walker, front-wheeled  -AS       Row Name 05/23/24 0942          Gait/Stairs (Locomotion)    [place] Level (Gait) standby assist  -AS     Assistive Device (Gait) walker, front-wheeled  -AS     Distance in Feet (Gait) 300  -AS     Deviations/Abnormal Patterns (Gait) stride length decreased;gait speed decreased  -AS     Bilateral Gait Deviations forward flexed posture  -AS     Comment, (Gait/Stairs) patient ambulated 300' with SBA and rolling walker for support, verbal cues for walker placement and upright posture. Further mobility limited by weakness and fatigue.  -AS               User Key  (r) = Recorded By, (t) = Taken By, (c) = Cosigned By      Initials Name Provider Type    AS Dana Begum PTA Physical Therapist Assistant                   Obj/Interventions       [facility] Name 05/23/24 0944          Motor Skills    Therapeutic Exercise knee;ankle  -AS       Row Name 05/23/24 0944          Knee (Therapeutic Exercise)    Knee (Therapeutic Exercise) strengthening exercise  -AS     Knee Strengthening (Therapeutic Exercise) bilateral;marching while seated;LAQ (long arc quad);sitting;10 repetitions  -AS       Row Name 05/23/24 0944          Ankle (Therapeutic Exercise)    Ankle (Therapeutic Exercise) AROM (active range of motion)  -AS     Ankle AROM (Therapeutic Exercise) bilateral;dorsiflexion;plantarflexion;sitting;10 repetitions  -AS       Row Name 05/23/24 0944          Balance    Dynamic Standing Balance standby assist  -AS     Position/Device Used, Standing Balance supported;walker, front-wheeled  -AS     Comment, Balance SBA for safety, no LOB or unsteadiness noticed  -AS               User Key  (r) = Recorded By, (t) = Taken By, (c) = Cosigned By       Initials Name Provider Type    AS Dana Begum PTA Physical Therapist Assistant                   Goals/Plan    No documentation.                  Clinical Impression       Row Name 05/23/24 0945          Pain    Pretreatment Pain Rating 0/10 - no pain  -AS     Posttreatment Pain Rating 0/10 - no pain  -AS       Row Name 05/23/24 0945          Plan of Care Review    Plan of Care Reviewed With patient  -AS     Progress improving  -AS     Outcome Evaluation patient ambulated 300' with SBA and rolling walker for support, verbal cues for walker placement and upright posture. Further mobility limited by weakness and fatigue. Completed B LE exercises with no complaints. Recommend home with HHPT at D/C.  -AS       Row Name 05/23/24 0945          Positioning and Restraints    Pre-Treatment Position sitting in chair/recliner  -AS     Post Treatment Position chair  -AS     In Chair reclined;call light within reach;encouraged to call for assist;exit alarm on;waffle cushion;legs elevated  -AS               User Key  (r) = Recorded By, (t) = Taken By, (c) = Cosigned By      Initials Name Provider Type    AS Dana Begum PTA Physical Therapist Assistant                   Outcome Measures       Row Name 05/23/24 0946          How much help from another person do you currently need...    Turning from your back to your side while in flat bed without using bedrails? 4  -AS     Moving from lying on back to sitting on the side of a flat bed without bedrails? 4  -AS     Moving to and from a bed to a chair (including a wheelchair)? 3  -AS     Standing up from a chair using your arms (e.g., wheelchair, bedside chair)? 4  -AS     Climbing 3-5 steps with a railing? 3  -AS     To walk in hospital room? 4  -AS     AM-PAC 6 Clicks Score (PT) 22  -AS     Highest Level of Mobility Goal 7 --> Walk 25 feet or more  -AS       Row Name 05/23/24 0946          Functional Assessment    Outcome Measure Options AM-PAC 6 Clicks Basic  Mobility (PT)  -AS               User Key  (r) = Recorded By, (t) = Taken By, (c) = Cosigned By      Initials Name Provider Type    AS Dana Begum PTA Physical Therapist Assistant                                   PT Recommendation and Plan     Plan of Care Reviewed With: patient  Progress: improving  Outcome Evaluation: patient ambulated 300' with SBA and rolling walker for support, verbal cues for walker placement and upright posture. Further mobility limited by weakness and fatigue. Completed B LE exercises with no complaints. Recommend home with HHPT at D/C.     Time Calculation:         PT Charges       Row Name 05/23/24 0946             Time Calculation    Start Time 0831  -AS      PT Received On 05/23/24  -AS      PT Goal Re-Cert Due Date 05/31/24  -AS         Timed Charges    44001 - PT Therapeutic Exercise Minutes 10  -AS      38543 - Gait Training Minutes  13  -AS         Total Minutes    Timed Charges Total Minutes 23  -AS       Total Minutes 23  -AS                User Key  (r) = Recorded By, (t) = Taken By, (c) = Cosigned By      Initials Name Provider Type    AS Dana Begum PTA Physical Therapist Assistant                  Therapy Charges for Today       Code Description Service Date Service Provider Modifiers Qty    23561630668 HC PT THER PROC EA 15 MIN 5/23/2024 Dana Begum PTA GP 1    09356269297 HC GAIT TRAINING EA 15 MIN 5/23/2024 Dana Begum PTA GP 1            PT G-Codes  Outcome Measure Options: AM-PAC 6 Clicks Basic Mobility (PT)  AM-PAC 6 Clicks Score (PT): 22       Dana Begum PTA  5/23/2024

## 2024-05-23 NOTE — PLAN OF CARE
Goal Outcome Evaluation:  Plan of Care Reviewed With: patient        Progress: improving  Outcome Evaluation: patient ambulated 300' with SBA and rolling walker for support, verbal cues for walker placement and upright posture. Further mobility limited by weakness and fatigue. Completed B LE exercises with no complaints. Recommend home with HHPT at D/C.

## 2024-05-23 NOTE — DISCHARGE INSTRUCTIONS
1.  Please hold cholesterol medicine until you see your primary doctor.  2. avoid Tylenol due to borderline liver functions.  Okay to use ibuprofen as needed with food

## 2024-05-23 NOTE — CASE MANAGEMENT/SOCIAL WORK
Case Management Discharge Note      Final Note: Met with pt at bedside to f/u DCP.  Plan is home today.  Discussed therapy recs for  services.  She is agreeable.  Given no agency preference, CM called referral to [facility] (accepted).  No other needs identified/voiced.         Selected Continued Care - Admitted Since 5/19/2024       Destination    No services have been selected for the patient.                Durable Medical Equipment    No services have been selected for the patient.                Dialysis/Infusion    No services have been selected for the patient.                Home Medical Care       Service Provider Selected Services Address Phone Fax Patient Preferred    [facility] ,  Home Rehabilitation [address], [address], [place] [address] [phone] --              Therapy    No services have been selected for the patient.                Community Resources    No services have been selected for the patient.                Community & DME    No services have been selected for the patient.                         Final Discharge Disposition Code: 06 - home with home health care

## 2024-05-24 ENCOUNTER — TRANSITIONAL CARE MANAGEMENT TELEPHONE ENCOUNTER (OUTPATIENT)
Dept: CALL CENTER | Facility: HOSPITAL | Age: 74
End: 2024-05-24
Payer: MEDICARE

## 2024-05-24 LAB
BACTERIA SPEC AEROBE CULT: NORMAL
BACTERIA SPEC AEROBE CULT: NORMAL

## 2024-05-24 NOTE — OUTREACH NOTE
Call Center TCM Note      Flowsheet Row Responses   [facility] patient discharged from? [place]   Does the patient have one of the following disease processes/diagnoses(primary or secondary)? Pneumonia   TCM attempt successful? Yes   Call start time 1116   Call end time 1121   Discharge diagnosis Pneumonia   Person spoke with today (if not patient) and relationship patient   Meds reviewed with patient/caregiver? Yes   Is the patient having any side effects they believe may be caused by any medication additions or changes? No   Does the patient have all medications ordered at discharge? Yes   Is the patient taking all medications as directed (includes completed medication regime)? Yes   Comments Patient has a previosuly scheduled followup on 5/30 with Dr. Calderón.   Does the patient have an appointment with their PCP within 7-14 days of discharge? Yes   Psychosocial issues? No   Did the patient receive a copy of their discharge instructions? Yes   Nursing interventions Reviewed instructions with patient   What is the patient's perception of their health status since discharge? Improving   Nursing Interventions Nurse provided patient education   Are the patient's immunizations up to date?  Yes   Is the patient/caregiver able to teach back the hierarchy of who to call/visit for symptoms/problems? PCP, Specialist, Home health nurse, Urgent Care, ED, 911 Yes   Is the patient/caregiver able to teach back signs and symptoms of worsening condition: Fever/chills, Shortness of breath   Is the patient/caregiver able to teach back importance of completing antibiotic course of treatment? Yes   TCM call completed? Yes   Wrap up additional comments Denies any needs at this time.   Call end time 1121   Would this patient benefit from a Referral to Amb Social Work? No   Is the patient interested in additional calls from an ambulatory ? No            Frank Pierre RN    5/24/2024, 11:21 EDT

## 2024-05-26 LAB
QT INTERVAL: 320 MS
QT INTERVAL: 368 MS
QTC INTERVAL: 455 MS
QTC INTERVAL: 476 MS

## 2024-05-30 ENCOUNTER — LAB (OUTPATIENT)
Dept: INTERNAL MEDICINE | Facility: CLINIC | Age: 74
End: 2024-05-30
Payer: MEDICARE

## 2024-05-30 ENCOUNTER — OFFICE VISIT (OUTPATIENT)
Dept: INTERNAL MEDICINE | Facility: CLINIC | Age: 74
End: 2024-05-30
Payer: MEDICARE

## 2024-05-30 VITALS
OXYGEN SATURATION: 96 % | HEART RATE: 102 BPM | WEIGHT: 201 LBS | BODY MASS INDEX: 32.3 KG/M2 | SYSTOLIC BLOOD PRESSURE: 130 MMHG | DIASTOLIC BLOOD PRESSURE: 88 MMHG | HEIGHT: 66 IN

## 2024-05-30 DIAGNOSIS — R79.89 ELEVATED LFTS: ICD-10-CM

## 2024-05-30 DIAGNOSIS — A48.1 LEGIONELLA PNEUMONIA: Primary | ICD-10-CM

## 2024-05-30 DIAGNOSIS — I10 BENIGN ESSENTIAL HTN: Primary | ICD-10-CM

## 2024-05-30 DIAGNOSIS — E87.1 HYPONATREMIA: ICD-10-CM

## 2024-05-30 DIAGNOSIS — Z86.19 HISTORY OF SEPSIS: ICD-10-CM

## 2024-05-30 DIAGNOSIS — J96.01 ACUTE RESPIRATORY FAILURE WITH HYPOXIA: ICD-10-CM

## 2024-05-30 PROBLEM — A41.9 SEVERE SEPSIS WITH ACUTE ORGAN DYSFUNCTION: Status: RESOLVED | Noted: 2024-05-23 | Resolved: 2024-05-30

## 2024-05-30 PROBLEM — R09.02 HYPOXIA: Status: RESOLVED | Noted: 2024-05-19 | Resolved: 2024-05-30

## 2024-05-30 PROBLEM — R65.20 SEVERE SEPSIS WITH ACUTE ORGAN DYSFUNCTION: Status: RESOLVED | Noted: 2024-05-23 | Resolved: 2024-05-30

## 2024-05-30 PROBLEM — J18.9 LEFT LOWER LOBE PNEUMONIA: Status: RESOLVED | Noted: 2024-05-18 | Resolved: 2024-05-30

## 2024-05-30 PROCEDURE — 36415 COLL VENOUS BLD VENIPUNCTURE: CPT | Performed by: INTERNAL MEDICINE

## 2024-05-30 PROCEDURE — 1111F DSCHRG MED/CURRENT MED MERGE: CPT | Performed by: INTERNAL MEDICINE

## 2024-05-30 PROCEDURE — 1159F MED LIST DOCD IN RCRD: CPT | Performed by: INTERNAL MEDICINE

## 2024-05-30 PROCEDURE — 1160F RVW MEDS BY RX/DR IN RCRD: CPT | Performed by: INTERNAL MEDICINE

## 2024-05-30 PROCEDURE — 1126F AMNT PAIN NOTED NONE PRSNT: CPT | Performed by: INTERNAL MEDICINE

## 2024-05-30 PROCEDURE — 3075F SYST BP GE 130 - 139MM HG: CPT | Performed by: INTERNAL MEDICINE

## 2024-05-30 PROCEDURE — 3079F DIAST BP 80-89 MM HG: CPT | Performed by: INTERNAL MEDICINE

## 2024-05-30 PROCEDURE — 99495 TRANSJ CARE MGMT MOD F2F 14D: CPT | Performed by: INTERNAL MEDICINE

## 2024-05-30 NOTE — PROGRESS NOTES
"Transitional Care Follow Up Visit  Subjective     Love Mackenzie is a 73 y.o. female who presents for a transitional care management visit.    Within 48 business hours after discharge our office contacted her via telephone to coordinate her care and needs.      I reviewed and discussed the details of that call along with the discharge summary, hospital problems, inpatient lab results, inpatient diagnostic studies, and consultation reports with Love.     Current outpatient and discharge medications have been reconciled for the patient.  Reviewed by: Edita Whitfield MA    Current outpatient and discharge medications have been reconciled for the patient.  Reviewed by: Silvia Garcia MD      Current Outpatient Medications:     guaiFENesin (MUCINEX) 600 MG 12 hr tablet, Take 1 tablet by mouth Every 12 (Twelve) Hours for 30 doses., Disp: 30 tablet, Rfl: 0            5/23/2024     6:52 PM   Date of TCM Phone Call   Hospital [facility]   Date of Admission 5/19/2024   Date of Discharge 5/23/2024   Discharge Disposition Home or Self Care     Risk for Readmission (LACE) Score: 8 (5/23/2024  6:00 AM)      History of Present Illness   Course During Hospital Stay: Per discharge summary \"Hospital Course:  Love Mackenzie is a 73 y.o. female who  has a past medical history of Arthritis and Hyperlipidemia. who presented to [place] ED on 5/17 with cough and fever, she stayed overnight and DCd home 5.18. She returned today 5/19 with increasing confusion, was found to have a temp pg 1-34 and to be hypoxic. She was sent to [facility] for admission.      Acute hypoxic respiratory failure: Secondary to #2  Community-acquired pneumonia with left lower lobe infiltration secondary to Legionella infection  Worsening left lower lobe effusion.    Elevated Pro-[place] and inflammatory markers   -Patient was initially in [facility] ED on the 17th.  She received 2 days of IV cephalosporin then she was discharged home on oral " "antibiotic.  -Came back with worsening symptoms.  -Patient was requiring 5 to 6 L of oxygen.  -Repeated chest x-ray showednterval increase in left lower lobe opacity compatible with consolidation and moderate effusion.  -Vancomycin added.  Rocephin switched to Zosyn.  Add azithromycin to cover atypical bacteria on 05/20.   Repeated CT chest showed dense left lower lobe consolidation compatible with left lower lobe pneumonia and small left sided pleural effusion without loculation.  Urine antigen is positive for Legionella.   -  Patient was on azithromycin 500 mg.  ID consulted.  Switch her to Levaquin.  Elevated BNP.  Lasix 40 mg IV ordered.  Echo with normal EF.  No comment on diastolic dysfunction.  Patient was on 6 L of nasal cannula weaned off to 2 L than room air.   -Discharge patient on Levaquin for total of 7 days.     Mild hyponatremia: Likely secondary to Legionella infection.  Resolved.     Sinus tachycardia with multiple PVCs/fusion complex?  Likely secondary to sepsis.  Improved  Elevated troponin likely demand secondary to the above.  Elevated proBNP  HLP  -EKG showed normal sinus rhythm with frequent PVCs.  No acute ischemic changes.  -Troponin flat 70s.  -Echo as above  -All the above is likely secondary to sepsis and respiratory failure.        Acute diarrhea.  Likely secondary to antibiotics.  Resolved  Patient was having  diarrhea.  3-4 times.   Denies abdominal pain or bowel movement.  C. difficile negative.  Imodium started on 05/22     Elevated transaminase: Likely secondary to sepsis/antibiotics  -AST more than ALT  -No right upper quadrant pain.  -CMP within a week.  -Consider liver ultrasound if no improvement.  As an outpatient  -Hold statin and Tylenol on discharge.        Discharge Follow Up Recommendations for outpatient labs/diagnostics:  Follow-up with PCP in 1 week with CMP.\"    Interval history: Patient overall feeling back to normal.  She denies any issues with diarrhea or with " "shortness of breath or cough.  She thinks she got the Legionella from an air conditioner unit.  Denies any acute complaints today.  Completed antibiotics.  No other medication changes made.    Objective   /88 (BP Location: Left arm)   Pulse 102   Ht 167.6 cm (66\")   Wt 91.2 kg (201 lb)   SpO2 96%   BMI 32.44 kg/m²   Physical Exam  Vitals reviewed.   Constitutional:       Appearance: She is well-developed.   Cardiovascular:      Rate and Rhythm: Normal rate and regular rhythm.      Heart sounds: Normal heart sounds.   Pulmonary:      Effort: Pulmonary effort is normal.      Breath sounds: Normal breath sounds. No wheezing or rales.   Skin:     General: Skin is warm and dry.   Neurological:      Mental Status: She is alert and oriented to person, place, and time.   Psychiatric:         Behavior: Behavior normal.         Thought Content: Thought content normal.         Assessment & Plan   Diagnoses and all orders for this visit:    1. Legionella pneumonia (Primary)  -Completed antibiotics.  Complicated by sepsis, hyponatremia, elevated LFTs, diarrhea.    2. Acute respiratory failure with hypoxia  -Resolved, on room air    3. History of sepsis  -Resolved    4. Elevated LFTs  -     Comprehensive Metabolic Panel    5. Hyponatremia  -Resolved at MA, but will recheck again today                 "

## 2024-06-11 ENCOUNTER — OUTSIDE FACILITY SERVICE (OUTPATIENT)
Dept: INTERNAL MEDICINE | Facility: CLINIC | Age: 74
End: 2024-06-11
Payer: MEDICARE

## 2024-06-11 PROCEDURE — G0180 MD CERTIFICATION HHA PATIENT: HCPCS | Performed by: INTERNAL MEDICINE

## 2024-06-27 LAB
QT INTERVAL: 334 MS
QTC INTERVAL: 428 MS

## 2024-07-01 ENCOUNTER — TELEPHONE (OUTPATIENT)
Dept: INTERNAL MEDICINE | Facility: CLINIC | Age: 74
End: 2024-07-01

## 2024-07-11 ENCOUNTER — OFFICE VISIT (OUTPATIENT)
Dept: INTERNAL MEDICINE | Facility: CLINIC | Age: 74
End: 2024-07-11
Payer: MEDICARE

## 2024-07-11 ENCOUNTER — LAB (OUTPATIENT)
Dept: INTERNAL MEDICINE | Facility: CLINIC | Age: 74
End: 2024-07-11
Payer: MEDICARE

## 2024-07-11 VITALS
SYSTOLIC BLOOD PRESSURE: 122 MMHG | DIASTOLIC BLOOD PRESSURE: 88 MMHG | OXYGEN SATURATION: 96 % | WEIGHT: 203 LBS | BODY MASS INDEX: 32.62 KG/M2 | HEART RATE: 87 BPM | HEIGHT: 66 IN

## 2024-07-11 DIAGNOSIS — I10 BENIGN ESSENTIAL HTN: Primary | ICD-10-CM

## 2024-07-11 DIAGNOSIS — R79.89 ELEVATED LFTS: ICD-10-CM

## 2024-07-11 LAB
ALBUMIN SERPL-MCNC: 4.2 G/DL (ref 3.5–5.2)
ALBUMIN/GLOB SERPL: 1.6 G/DL
ALP SERPL-CCNC: 83 U/L (ref 39–117)
ALT SERPL W P-5'-P-CCNC: 17 U/L (ref 1–33)
ANION GAP SERPL CALCULATED.3IONS-SCNC: 11.8 MMOL/L (ref 5–15)
AST SERPL-CCNC: 25 U/L (ref 1–32)
BILIRUB SERPL-MCNC: 0.5 MG/DL (ref 0–1.2)
BUN SERPL-MCNC: 14 MG/DL (ref 8–23)
BUN/CREAT SERPL: 14.3 (ref 7–25)
CALCIUM SPEC-SCNC: 9.5 MG/DL (ref 8.6–10.5)
CHLORIDE SERPL-SCNC: 106 MMOL/L (ref 98–107)
CO2 SERPL-SCNC: 23.2 MMOL/L (ref 22–29)
CREAT SERPL-MCNC: 0.98 MG/DL (ref 0.57–1)
EGFRCR SERPLBLD CKD-EPI 2021: 61.8 ML/MIN/1.73
GLOBULIN UR ELPH-MCNC: 2.7 GM/DL
GLUCOSE SERPL-MCNC: 86 MG/DL (ref 65–99)
POTASSIUM SERPL-SCNC: 4.6 MMOL/L (ref 3.5–5.2)
PROT SERPL-MCNC: 6.9 G/DL (ref 6–8.5)
SODIUM SERPL-SCNC: 141 MMOL/L (ref 136–145)

## 2024-07-11 PROCEDURE — 36415 COLL VENOUS BLD VENIPUNCTURE: CPT | Performed by: INTERNAL MEDICINE

## 2024-07-11 PROCEDURE — 80053 COMPREHEN METABOLIC PANEL: CPT | Performed by: INTERNAL MEDICINE

## 2024-07-11 PROCEDURE — 99213 OFFICE O/P EST LOW 20 MIN: CPT | Performed by: INTERNAL MEDICINE

## 2024-07-11 PROCEDURE — 3079F DIAST BP 80-89 MM HG: CPT | Performed by: INTERNAL MEDICINE

## 2024-07-11 PROCEDURE — 1126F AMNT PAIN NOTED NONE PRSNT: CPT | Performed by: INTERNAL MEDICINE

## 2024-07-11 PROCEDURE — 3074F SYST BP LT 130 MM HG: CPT | Performed by: INTERNAL MEDICINE

## 2024-07-11 NOTE — PROGRESS NOTES
"Subjective   Love Mackenzie is a 73 y.o. female here for follow-up HTN and elevated LFTs.  She says she is still doing well since I last saw her, no new developments.  Still breathing normally.    I have reviewed the patient's relevant medical history and confirmed it is accurate.    I have personally reviewed and performed the ROS. Silvia Garcia MD     Objective   /88 (BP Location: Left arm)   Pulse 87   Ht 167.6 cm (66\")   Wt 92.1 kg (203 lb)   SpO2 96%   BMI 32.77 kg/m²     Physical Exam  Constitutional:       Appearance: She is well-developed.   Eyes:      General: No scleral icterus.  Pulmonary:      Effort: Pulmonary effort is normal.   Skin:     Coloration: Skin is not jaundiced.   Neurological:      General: No focal deficit present.      Mental Status: She is alert.   Psychiatric:         Behavior: Behavior normal.         Thought Content: Thought content normal.         Judgment: Judgment normal.         No current outpatient medications on file.    Assessment & Plan   Diagnoses and all orders for this visit:    1. Benign essential HTN (Primary)  -Excellent control, off medication    2. Elevated LFTs  -     Comprehensive Metabolic Panel  -Advised patient if LFTs continue to increase I will get the liver ultrasound and some additional blood work, possibly refer to GI.  If I am seeing a trending down which I am anticipating, then we will continue to monitor over the next few months             Silvia Garcia MD      Answers submitted by the patient for this visit:  Other (Submitted on 7/9/2024)  Please describe your symptoms.: Follow up appt  Have you had these symptoms before?: No  How long have you been having these symptoms?: 1-4 days  Primary Reason for Visit (Submitted on 7/9/2024)  What is the primary reason for your visit?: Other    "

## 2024-09-10 ENCOUNTER — OFFICE VISIT (OUTPATIENT)
Dept: INTERNAL MEDICINE | Facility: CLINIC | Age: 74
End: 2024-09-10
Payer: MEDICARE

## 2024-09-10 VITALS
DIASTOLIC BLOOD PRESSURE: 90 MMHG | OXYGEN SATURATION: 97 % | BODY MASS INDEX: 32.78 KG/M2 | HEART RATE: 81 BPM | WEIGHT: 204 LBS | HEIGHT: 66 IN | SYSTOLIC BLOOD PRESSURE: 138 MMHG

## 2024-09-10 DIAGNOSIS — E55.9 VITAMIN D DEFICIENCY: ICD-10-CM

## 2024-09-10 DIAGNOSIS — L65.9 HAIR LOSS: Primary | ICD-10-CM

## 2024-09-10 PROCEDURE — 82607 VITAMIN B-12: CPT | Performed by: INTERNAL MEDICINE

## 2024-09-10 PROCEDURE — 84630 ASSAY OF ZINC: CPT | Performed by: INTERNAL MEDICINE

## 2024-09-10 PROCEDURE — 80048 BASIC METABOLIC PNL TOTAL CA: CPT | Performed by: INTERNAL MEDICINE

## 2024-09-10 PROCEDURE — 99213 OFFICE O/P EST LOW 20 MIN: CPT | Performed by: INTERNAL MEDICINE

## 2024-09-10 PROCEDURE — 1126F AMNT PAIN NOTED NONE PRSNT: CPT | Performed by: INTERNAL MEDICINE

## 2024-09-10 PROCEDURE — 36415 COLL VENOUS BLD VENIPUNCTURE: CPT | Performed by: INTERNAL MEDICINE

## 2024-09-10 PROCEDURE — 84439 ASSAY OF FREE THYROXINE: CPT | Performed by: INTERNAL MEDICINE

## 2024-09-10 PROCEDURE — 84443 ASSAY THYROID STIM HORMONE: CPT | Performed by: INTERNAL MEDICINE

## 2024-09-10 PROCEDURE — 3080F DIAST BP >= 90 MM HG: CPT | Performed by: INTERNAL MEDICINE

## 2024-09-10 PROCEDURE — 82306 VITAMIN D 25 HYDROXY: CPT | Performed by: INTERNAL MEDICINE

## 2024-09-10 PROCEDURE — 3075F SYST BP GE 130 - 139MM HG: CPT | Performed by: INTERNAL MEDICINE

## 2024-09-10 NOTE — PROGRESS NOTES
"Subjective   Love Mackenzie is a 74 y.o. female here for hair loss over the last 2 weeks.  It is thinning on the sides and on the crown.  No itching or irritation.  Just noticed more hair loss with brushing and in the sink.  She says her hairstylist has also said that she is losing hair.  She takes quite a bit of supplements, on no prescribed medications.    I have reviewed the patient's relevant medical history and confirmed it is accurate.    I have personally reviewed and performed the ROS. Silvia Garcia MD     Objective   /90 (BP Location: Left arm)   Pulse 81   Ht 167.6 cm (66\")   Wt 92.5 kg (204 lb)   SpO2 97%   BMI 32.93 kg/m²     Physical Exam  Constitutional:       Appearance: She is well-developed.   Pulmonary:      Effort: Pulmonary effort is normal.   Skin:     Comments: Diffuse hair loss particularly around temples and crown of head, no patchiness or irritation   Neurological:      General: No focal deficit present.      Mental Status: She is alert.   Psychiatric:         Behavior: Behavior normal.         Thought Content: Thought content normal.         Judgment: Judgment normal.         No current outpatient medications on file.    Assessment & Plan   Diagnoses and all orders for this visit:    1. Hair loss (Primary)  -     Vitamin D,25-Hydroxy  -     Vitamin B12  -     TSH  -     T4, Free  -     Zinc  -     Basic Metabolic Panel  -Recommend patient also check with her dermatologist regarding hair loss, may benefit from Rogaine    2. Vitamin D deficiency  -     Vitamin D,25-Hydroxy             Silvia Garcia MD      Answers submitted by the patient for this visit:  Other (Submitted on 9/3/2024)  Please describe your symptoms.: Recent hair loss.  Have you had these symptoms before?: No  How long have you been having these symptoms?: Greater than 2 weeks  Primary Reason for Visit (Submitted on 9/3/2024)  What is the primary reason for your visit?: Other    "

## 2024-09-11 LAB
25(OH)D3 SERPL-MCNC: 57.2 NG/ML (ref 30–100)
ANION GAP SERPL CALCULATED.3IONS-SCNC: 11 MMOL/L (ref 5–15)
BUN SERPL-MCNC: 19 MG/DL (ref 8–23)
BUN/CREAT SERPL: 22.4 (ref 7–25)
CALCIUM SPEC-SCNC: 9.6 MG/DL (ref 8.6–10.5)
CHLORIDE SERPL-SCNC: 102 MMOL/L (ref 98–107)
CO2 SERPL-SCNC: 26 MMOL/L (ref 22–29)
CREAT SERPL-MCNC: 0.85 MG/DL (ref 0.57–1)
EGFRCR SERPLBLD CKD-EPI 2021: 72.9 ML/MIN/1.73
GLUCOSE SERPL-MCNC: 83 MG/DL (ref 65–99)
POTASSIUM SERPL-SCNC: 4.5 MMOL/L (ref 3.5–5.2)
SODIUM SERPL-SCNC: 139 MMOL/L (ref 136–145)
T4 FREE SERPL-MCNC: 1.12 NG/DL (ref 0.92–1.68)
TSH SERPL DL<=0.05 MIU/L-ACNC: 2.65 UIU/ML (ref 0.27–4.2)
VIT B12 BLD-MCNC: 707 PG/ML (ref 211–946)

## 2024-09-14 LAB — ZINC SERPL-MCNC: 100 UG/DL (ref 44–115)

## 2024-11-13 ENCOUNTER — OFFICE VISIT (OUTPATIENT)
Dept: INTERNAL MEDICINE | Facility: CLINIC | Age: 74
End: 2024-11-13
Payer: MEDICARE

## 2024-11-13 VITALS
SYSTOLIC BLOOD PRESSURE: 122 MMHG | BODY MASS INDEX: 33.27 KG/M2 | HEIGHT: 66 IN | WEIGHT: 207 LBS | HEART RATE: 88 BPM | DIASTOLIC BLOOD PRESSURE: 82 MMHG | OXYGEN SATURATION: 97 %

## 2024-11-13 DIAGNOSIS — I10 BENIGN ESSENTIAL HTN: ICD-10-CM

## 2024-11-13 DIAGNOSIS — Z00.00 MEDICARE ANNUAL WELLNESS VISIT, SUBSEQUENT: Primary | ICD-10-CM

## 2024-11-13 PROCEDURE — 1159F MED LIST DOCD IN RCRD: CPT | Performed by: INTERNAL MEDICINE

## 2024-11-13 PROCEDURE — 36415 COLL VENOUS BLD VENIPUNCTURE: CPT | Performed by: INTERNAL MEDICINE

## 2024-11-13 PROCEDURE — 1160F RVW MEDS BY RX/DR IN RCRD: CPT | Performed by: INTERNAL MEDICINE

## 2024-11-13 PROCEDURE — 80053 COMPREHEN METABOLIC PANEL: CPT | Performed by: INTERNAL MEDICINE

## 2024-11-13 PROCEDURE — 3074F SYST BP LT 130 MM HG: CPT | Performed by: INTERNAL MEDICINE

## 2024-11-13 PROCEDURE — 80061 LIPID PANEL: CPT | Performed by: INTERNAL MEDICINE

## 2024-11-13 PROCEDURE — 85027 COMPLETE CBC AUTOMATED: CPT | Performed by: INTERNAL MEDICINE

## 2024-11-13 PROCEDURE — G0439 PPPS, SUBSEQ VISIT: HCPCS | Performed by: INTERNAL MEDICINE

## 2024-11-13 PROCEDURE — 1126F AMNT PAIN NOTED NONE PRSNT: CPT | Performed by: INTERNAL MEDICINE

## 2024-11-13 PROCEDURE — 3079F DIAST BP 80-89 MM HG: CPT | Performed by: INTERNAL MEDICINE

## 2024-11-13 NOTE — PROGRESS NOTES
"The ABCs of the Annual Wellness Visit  Subsequent Medicare Wellness Visit    Chief Complaint   Patient presents with    Medicare Wellness-Initial Visit      Subjective    History of Present Illness:  Love Mackenzie is a 74 y.o. female who presents for a Subsequent Medicare Wellness Visit. No acute issues today.    The following portions of the patient's history were reviewed and   updated as appropriate: allergies, current medications, past medical history, past social history, past surgical history, and problem list.     Compared to one year ago, the patient feels her physical   health is the same.    Compared to one year ago, the patient feels her mental   health is the same.    Recent Hospitalizations:  This patient has had a [facility] admission record on file within the last 365 days.    Current Medical Providers:  Patient Care Team:  Silvia Garcia MD as PCP - General (Internal Medicine)    No outpatient medications prior to visit.     No facility-administered medications prior to visit.       No opioid medication identified on active medication list. I have reviewed chart for other potential  high risk medication/s and harmful drug interactions in the elderly.        Aspirin is not on active medication list.  Aspirin use is not indicated based on review of current medical condition/s. Risk of harm outweighs potential benefits.  .    Patient Active Problem List   Diagnosis    Benign essential HTN     Advance Care Planning   Advance Directive is not on file.  ACP discussion was declined by the patient. Patient does not have an advance directive, declines further assistance.       Objective       Vitals:    11/13/24 0951   BP: 122/82   BP Location: Left arm   Pulse: 88   SpO2: 97%   Weight: 93.9 kg (207 lb)   Height: 167.6 cm (66\")     BMI Readings from Last 1 Encounters:   11/13/24 33.41 kg/m²   BMI is within normal parameters. No follow-up required.  BMI Readings from Last 1 Encounters: "   11/13/24 33.41 kg/m²   BMI is above normal parameters. Recommendations include: Information on healthy weight added to patient's after visit summary    Does the patient have evidence of cognitive impairment? No    Physical Exam  Vitals reviewed.   Constitutional:       General: She is not in acute distress.     Appearance: Normal appearance. She is well-developed.   HENT:      Head: Normocephalic and atraumatic.      Right Ear: Tympanic membrane, ear canal and external ear normal.      Left Ear: Tympanic membrane, ear canal and external ear normal.      Nose: Nose normal.      Mouth/Throat:      Lips: Pink.      Mouth: Mucous membranes are moist.      Tongue: No lesions.      Pharynx: Oropharynx is clear.   Eyes:      General: Lids are normal. Vision grossly intact. No scleral icterus.     Conjunctiva/sclera: Conjunctivae normal.      Pupils: Pupils are equal, round, and reactive to light.   Neck:      Thyroid: No thyroid mass, thyromegaly or thyroid tenderness.      Vascular: No carotid bruit.   Cardiovascular:      Rate and Rhythm: Normal rate and regular rhythm.      Heart sounds: Normal heart sounds. No murmur heard.     No friction rub. No gallop. No S3 or S4 sounds.   Pulmonary:      Effort: Pulmonary effort is normal.      Breath sounds: Normal breath sounds. No wheezing, rhonchi or rales.   Abdominal:      General: Bowel sounds are normal. There is no distension.      Palpations: Abdomen is soft. There is no mass.      Tenderness: There is no abdominal tenderness.   Musculoskeletal:         General: Normal range of motion.      Cervical back: Neck supple.      Right lower leg: No edema.      Left lower leg: No edema.   Lymphadenopathy:      Cervical: No cervical adenopathy.   Skin:     General: Skin is warm and dry.      Coloration: Skin is not pale.      Findings: No erythema or rash.   Neurological:      Mental Status: She is alert and oriented to person, place, and time. Mental status is at baseline.       Cranial Nerves: No cranial nerve deficit.      Sensory: No sensory deficit.      Gait: Gait is intact.      Comments: CNs grossly intact. Balance grossly intact.   Psychiatric:         Attention and Perception: Attention normal.         Mood and Affect: Mood normal.         Speech: Speech normal.         Behavior: Behavior normal.         Thought Content: Thought content normal.         Judgment: Judgment normal.                 HEALTH RISK ASSESSMENT    Smoking Status:  Social History     Tobacco Use   Smoking Status Never    Passive exposure: Never   Smokeless Tobacco Never     Alcohol Consumption:  Social History     Substance and Sexual Activity   Alcohol Use Yes    Alcohol/week: 2.0 standard drinks of alcohol    Types: 2 Glasses of wine per week    Comment: 2-3     Fall Risk Screen:    STEADI Fall Risk Assessment was completed, and patient is at LOW risk for falls.Assessment completed on:2024    Depression Screenin/13/2024     9:00 AM   PHQ-2/PHQ-9 Depression Screening   Little interest or pleasure in doing things Several days   Feeling down, depressed, or hopeless Several days   Trouble falling or staying asleep, or sleeping too much Not at all   Feeling tired or having little energy Not at all   Poor appetite or overeating Not at all   Feeling bad about yourself - or that you are a failure or have let yourself or your family down Not at all   Trouble concentrating on things, such as reading the newspaper or watching television Not at all   Moving or speaking so slowly that other people could have noticed? Or the opposite - being so fidgety or restless that you have been moving around a lot more than usual. Not at all   Thoughts that you would be better off dead or hurting yourself in some way Not at all   Patient Health Questionnaire-9 Score 2   How difficult have these problems made it for you to do your work, take care of things at home, or get along with other people? Not difficult at all        Health Habits and Functional and Cognitive Screenin/6/2024     1:18 PM   Functional & Cognitive Status   Do you have difficulty preparing food and eating? No    Do you have difficulty bathing yourself, getting dressed or grooming yourself? No    Do you have difficulty using the toilet? No    Do you have difficulty moving around from place to place? No    Do you have trouble with steps or getting out of a bed or a chair? No    Current Diet Well Balanced Diet    Dental Exam Up to date    Eye Exam Up to date    Exercise (times per week) 1 times per week    Current Exercises Include House Cleaning;Light Weights;Walking;Yard Work    Do you need help using the phone?  No    Are you deaf or do you have serious difficulty hearing?  No    Do you need help to go to places out of walking distance? No    Do you need help shopping? No    Do you need help preparing meals?  No    Do you need help with housework?  No    Do you need help with laundry? No    Do you need help taking your medications? No    Do you need help managing money? No    Do you ever drive or ride in a car without wearing a seat belt? No    Have you felt unusual stress, anger or loneliness in the last month? Yes    Who do you live with? Spouse    If you need help, do you have trouble finding someone available to you? No    Have you been bothered in the last four weeks by sexual problems? No    Do you have difficulty concentrating, remembering or making decisions? No        Patient-reported       Age-appropriate Screening Schedule:  Refer to the list below for future screening recommendations based on patient's age, sex and/or medical conditions. Orders for these recommended tests are listed in the plan section. The patient has been provided with a written plan.    Health Maintenance   Topic Date Due    INFLUENZA VACCINE  Never done    COVID-19 Vaccine ( season) Never done    ANNUAL WELLNESS VISIT  2024    LIPID PANEL  2024     BMI FOLLOWUP  11/08/2024    DXA SCAN  12/07/2025    MAMMOGRAM  04/19/2026    TDAP/TD VACCINES (2 - Td or Tdap) 03/09/2031    COLORECTAL CANCER SCREENING  01/29/2034    HEPATITIS C SCREENING  Completed    Pneumococcal Vaccine 65+  Completed    ZOSTER VACCINE  Completed              Assessment & Plan     CMS Preventative Services Quick Reference  Risk Factors Identified During Encounter  Immunizations Discussed/Encouraged: Influenza  The above risks/problems have been discussed with the patient.  Follow up actions/plans if indicated are seen below in the Assessment/Plan Section.  Pertinent information has been shared with the patient in the After Visit Summary.    Diagnoses and all orders for this visit:    1. Medicare annual wellness visit, subsequent (Primary)  -UTD mammo and CSY  -yearly labs as below  -discussed derm and dental screenings  -UTD eligible vaccines    2. Benign essential HTN  -     CBC (No Diff)  -     Comprehensive Metabolic Panel  -     Lipid Panel        Follow Up:   No follow-ups on file.     An After Visit Summary and PPPS were given to the patient.

## 2024-11-14 LAB
ALBUMIN SERPL-MCNC: 4.4 G/DL (ref 3.5–5.2)
ALBUMIN/GLOB SERPL: 1.6 G/DL
ALP SERPL-CCNC: 104 U/L (ref 39–117)
ALT SERPL W P-5'-P-CCNC: 17 U/L (ref 1–33)
ANION GAP SERPL CALCULATED.3IONS-SCNC: 12.2 MMOL/L (ref 5–15)
AST SERPL-CCNC: 26 U/L (ref 1–32)
BILIRUB SERPL-MCNC: 0.4 MG/DL (ref 0–1.2)
BUN SERPL-MCNC: 16 MG/DL (ref 8–23)
BUN/CREAT SERPL: 20.5 (ref 7–25)
CALCIUM SPEC-SCNC: 9.3 MG/DL (ref 8.6–10.5)
CHLORIDE SERPL-SCNC: 102 MMOL/L (ref 98–107)
CHOLEST SERPL-MCNC: 252 MG/DL (ref 0–200)
CO2 SERPL-SCNC: 24.8 MMOL/L (ref 22–29)
CREAT SERPL-MCNC: 0.78 MG/DL (ref 0.57–1)
DEPRECATED RDW RBC AUTO: 42.3 FL (ref 37–54)
EGFRCR SERPLBLD CKD-EPI 2021: 80.8 ML/MIN/1.73
ERYTHROCYTE [DISTWIDTH] IN BLOOD BY AUTOMATED COUNT: 12.8 % (ref 12.3–15.4)
GLOBULIN UR ELPH-MCNC: 2.8 GM/DL
GLUCOSE SERPL-MCNC: 87 MG/DL (ref 65–99)
HCT VFR BLD AUTO: 43.7 % (ref 34–46.6)
HDLC SERPL-MCNC: 53 MG/DL (ref 40–60)
HGB BLD-MCNC: 14 G/DL (ref 12–15.9)
LDLC SERPL CALC-MCNC: 174 MG/DL (ref 0–100)
LDLC/HDLC SERPL: 3.23 {RATIO}
MCH RBC QN AUTO: 29 PG (ref 26.6–33)
MCHC RBC AUTO-ENTMCNC: 32 G/DL (ref 31.5–35.7)
MCV RBC AUTO: 90.5 FL (ref 79–97)
PLATELET # BLD AUTO: 209 10*3/MM3 (ref 140–450)
PMV BLD AUTO: 11.7 FL (ref 6–12)
POTASSIUM SERPL-SCNC: 4.2 MMOL/L (ref 3.5–5.2)
PROT SERPL-MCNC: 7.2 G/DL (ref 6–8.5)
RBC # BLD AUTO: 4.83 10*6/MM3 (ref 3.77–5.28)
SODIUM SERPL-SCNC: 139 MMOL/L (ref 136–145)
TRIGL SERPL-MCNC: 139 MG/DL (ref 0–150)
VLDLC SERPL-MCNC: 25 MG/DL (ref 5–40)
WBC NRBC COR # BLD AUTO: 5.1 10*3/MM3 (ref 3.4–10.8)

## 2024-11-18 ENCOUNTER — TELEPHONE (OUTPATIENT)
Dept: INTERNAL MEDICINE | Facility: CLINIC | Age: 74
End: 2024-11-18
Payer: MEDICARE

## 2024-11-18 NOTE — PROGRESS NOTES
Pt notified and verbalized understanding. Pt was fasting at the time. She will get red yeast rice otc and start taking today